# Patient Record
Sex: FEMALE | Race: WHITE | NOT HISPANIC OR LATINO | Employment: FULL TIME | URBAN - METROPOLITAN AREA
[De-identification: names, ages, dates, MRNs, and addresses within clinical notes are randomized per-mention and may not be internally consistent; named-entity substitution may affect disease eponyms.]

---

## 2017-01-01 PROCEDURE — 99283 EMERGENCY DEPT VISIT LOW MDM: CPT

## 2017-01-02 ENCOUNTER — HOSPITAL ENCOUNTER (EMERGENCY)
Facility: HOSPITAL | Age: 57
Discharge: HOME | End: 2017-01-02
Attending: EMERGENCY MEDICINE | Admitting: EMERGENCY MEDICINE

## 2017-01-02 ENCOUNTER — TELEPHONE (OUTPATIENT)
Dept: SOCIAL WORK | Facility: HOSPITAL | Age: 57
End: 2017-01-02

## 2017-01-02 ENCOUNTER — APPOINTMENT (OUTPATIENT)
Dept: GENERAL RADIOLOGY | Facility: HOSPITAL | Age: 57
End: 2017-01-02

## 2017-01-02 VITALS
TEMPERATURE: 97.5 F | HEIGHT: 67 IN | RESPIRATION RATE: 16 BRPM | HEART RATE: 119 BPM | WEIGHT: 230 LBS | BODY MASS INDEX: 36.1 KG/M2 | SYSTOLIC BLOOD PRESSURE: 116 MMHG | OXYGEN SATURATION: 94 % | DIASTOLIC BLOOD PRESSURE: 79 MMHG

## 2017-01-02 DIAGNOSIS — R10.32 LEFT GROIN PAIN: Primary | ICD-10-CM

## 2017-01-02 PROCEDURE — 73502 X-RAY EXAM HIP UNI 2-3 VIEWS: CPT

## 2017-01-02 RX ORDER — HYDROCODONE BITARTRATE AND ACETAMINOPHEN 7.5; 325 MG/1; MG/1
1 TABLET ORAL EVERY 6 HOURS PRN
Qty: 12 TABLET | Refills: 0 | Status: SHIPPED | OUTPATIENT
Start: 2017-01-02 | End: 2017-05-15

## 2017-01-02 RX ORDER — METHOCARBAMOL 500 MG/1
500 TABLET, FILM COATED ORAL 4 TIMES DAILY PRN
Qty: 20 TABLET | Refills: 0 | Status: SHIPPED | OUTPATIENT
Start: 2017-01-02 | End: 2017-05-15

## 2017-01-02 RX ORDER — NAPROXEN 500 MG/1
500 TABLET ORAL 2 TIMES DAILY PRN
Qty: 20 TABLET | Refills: 0 | Status: SHIPPED | OUTPATIENT
Start: 2017-01-02 | End: 2017-05-15

## 2017-01-02 RX ORDER — IBUPROFEN 800 MG/1
800 TABLET ORAL ONCE
Status: COMPLETED | OUTPATIENT
Start: 2017-01-02 | End: 2017-01-02

## 2017-01-02 RX ADMIN — IBUPROFEN 800 MG: 800 TABLET, FILM COATED ORAL at 02:41

## 2017-01-02 NOTE — ED PROVIDER NOTES
EMERGENCY DEPARTMENT ENCOUNTER    CHIEF COMPLAINT  Chief Complaint: Leg Pain  History given by: Patient  History limited by: Nothing  Room Number: 03/03  PMD: Jonnie Crowell MD      HPI:  Pt is a 56 y.o. female who presents complaining of sharp shooting left upper leg pain that began last night with no known trauma. The patient states that the pain radiates through the groin and down her leg. The patient reports that pain is positional and worsened with sitting down or movement of her LLE. She states that she's had plantar fasciitis for the past two months and she's been ambulating with a limp/ has been overcompensating on her left side since onset. She denies abdominal pain and has no other complaints.     Duration:  1 day  Onset: Gradual  Timing: Constant  Location: Left upper leg  Radiation: Left groin  Quality: Sharp  Intensity/Severity: Moderate  Progression: Worsening  Associated Symptoms: Leg pain  Aggravating Factors: Movement or sitting  Alleviating Factors: Standing  Previous Episodes: None  Treatment before arrival: None    PAST MEDICAL HISTORY  Active Ambulatory Problems     Diagnosis Date Noted   • Cervical radiculopathy 02/24/2016   • Edema 02/24/2016   • Ulnar nerve entrapment 02/24/2016   • Fibromyalgia 02/24/2016   • Insomnia 02/24/2016   • Multiple sclerosis 02/24/2016   • Varicose veins of lower extremities 02/24/2016   • Thumb tendonitis 04/13/2016   • Osteoarthritis of right thumb 04/13/2016   • Restless leg syndrome 12/02/2016     Resolved Ambulatory Problems     Diagnosis Date Noted   • No Resolved Ambulatory Problems     No Additional Past Medical History       PAST SURGICAL HISTORY  History reviewed. No pertinent past surgical history.    FAMILY HISTORY  History reviewed. No pertinent family history.    SOCIAL HISTORY  Social History     Social History   • Marital status:      Spouse name: N/A   • Number of children: N/A   • Years of education: N/A     Occupational History    • Not on file.     Social History Main Topics   • Smoking status: Former Smoker   • Smokeless tobacco: Not on file   • Alcohol use Not on file   • Drug use: Not on file   • Sexual activity: Not on file     Other Topics Concern   • Not on file     Social History Narrative       ALLERGIES  Review of patient's allergies indicates no known allergies.    REVIEW OF SYSTEMS  Review of Systems   Constitutional: Negative for chills and fatigue.   HENT: Negative for congestion, rhinorrhea and sore throat.    Eyes: Negative for pain.   Respiratory: Negative for cough, chest tightness, shortness of breath and wheezing.    Cardiovascular: Negative for chest pain, palpitations and leg swelling.   Gastrointestinal: Negative for abdominal pain, diarrhea, nausea and vomiting.   Genitourinary: Negative for difficulty urinating, dysuria, flank pain and frequency.   Musculoskeletal: Positive for myalgias (Left upper leg ). Negative for arthralgias, neck pain and neck stiffness.   Skin: Negative for rash.   Neurological: Negative for dizziness, speech difficulty, weakness, light-headedness, numbness and headaches.   Psychiatric/Behavioral: Negative.    All other systems reviewed and are negative.      PHYSICAL EXAM  ED Triage Vitals   Temp Heart Rate Resp BP SpO2   01/01/17 2101 01/01/17 2101 01/01/17 2101 01/01/17 2108 01/01/17 2101   97.5 °F (36.4 °C) 119 16 151/71 99 %      Temp src Heart Rate Source Patient Position BP Location FiO2 (%)   01/01/17 2101 01/01/17 2101 -- -- --   Tympanic Monitor          Physical Exam   Constitutional: She is oriented to person, place, and time and well-developed, well-nourished, and in no distress. No distress.   HENT:   Head: Normocephalic.   Eyes: EOM are normal. Pupils are equal, round, and reactive to light.   Neck: Normal range of motion.   Cardiovascular: Normal rate and regular rhythm.    No murmur heard.  Pulmonary/Chest: Effort normal and breath sounds normal. No respiratory distress.    Abdominal: Soft. There is no tenderness. There is no rebound and no guarding.   Musculoskeletal: She exhibits no edema.        Left hip: She exhibits decreased range of motion and tenderness.   Neurological: She is alert and oriented to person, place, and time.   Skin: Skin is warm and dry. No rash noted.   Psychiatric: Mood and affect normal.   Nursing note and vitals reviewed.      RADIOLOGY  XR Hip With or Without Pelvis 2 - 3 View Left   Final Result         0325  Reviewed XR L Hip - Negative. Independently viewed by me. Interpreted by radiologist.     I ordered the above noted radiological studies. Interpreted by radiologist.. Reviewed by me in PACS.       PROCEDURES  Procedures      PROGRESS AND CONSULTS  ED Course     0159  Ordered XR L Hip for further evaluation. Also ordered Advil for pain control.     0235  Discussed case with  and he agrees with the treatment plan.     0330  Rechecked patient and they are resting comfortably. Discussed the patient's pertinent imaging results, including negative XR. Discussed plan to discharge the patient home and recommended follow up with  (Ortho). Patient agrees with the plan and all questions were addressed.     Latest vital signs   BP- 119/76 HR- 119 Temp- 97.5 °F (36.4 °C) (Tympanic) O2 sat- 99%      MEDICAL DECISION MAKING  Results were reviewed/discussed with the patient and they were also made aware of online access. Pt also made aware that some labs, such as cultures, will not be resulted during ER visit and follow up with PMD is necessary.     MDM  Number of Diagnoses or Management Options     Amount and/or Complexity of Data Reviewed  Tests in the radiology section of CPT®: ordered and reviewed    Patient Progress  Patient progress: stable         DIAGNOSIS  Final diagnoses:   Left groin pain       DISPOSITION  DISCHARGE    Patient discharged in stable condition.    Reviewed implications of results, diagnosis, meds, responsibility to follow  up, warning signs and symptoms of possible worsening, potential complications and reasons to return to ER, including worsening left leg pain    Patient/Family voiced understanding of above instructions.    Discussed plan for discharge, as there is no emergent indication for admission.  Pt/family is agreeable and understands need for follow up and repeat testing.  Pt is aware that discharge does not mean that nothing is wrong but it indicates no emergency is present that requires admission and they must continue care with follow-up as given below or physician of their choice.     FOLLOW-UP  Riky Rutledge MD  [address] [id number]  [phone]    Schedule an appointment as soon as possible for a visit  For further evaluation and treatment       Medication List      New Prescriptions          HYDROcodone-acetaminophen 7.5-325 MG per tablet   Commonly known as:  NORCO   Take 1 tablet by mouth Every 6 (Six) Hours As Needed for severe pain   (7-10).       methocarbamol 500 MG tablet   Commonly known as:  ROBAXIN   Take 1 tablet by mouth 4 (Four) Times a Day As Needed for muscle spasms.       naproxen 500 MG tablet   Commonly known as:  NAPROSYN   Take 1 tablet by mouth 2 (Two) Times a Day As Needed for mild pain (1-3).         Stop          meloxicam 15 MG tablet   Commonly known as:  MOBIC           Latest Documented Vital Signs:  As of 3:21 AM  BP- 119/76 HR- 119 Temp- 97.5 °F (36.4 °C) (Tympanic) O2 sat- 99%    --  Documentation assistance provided by mark Mccormick for Celestine Barba PA-C.  Information recorded by the mark was done at my direction and has been verified and validated by me.       Babak Mccormick  01/02/17 0325       THAIS Lyle III  01/04/17 1331

## 2017-01-02 NOTE — ED PROVIDER NOTES
I supervised care provided by the midlevel provider.    We have discussed this patient's history, physical exam, and treatment plan.   I have reviewed the note and personally saw and examined the patient and agree with the plan of care.    Pt with L upper leg pain that onset yesterday AM. Pt states the pain is worse with movement. She is currently in tx for plantar fascisitis for the past 2 months.     On eam, there is tenderness of the L inguinal and increased pain with ROM. The thigh is non-tender and there is no swelling or erythema.    She will be discharged with pain meds and follow up for her plantar fascitis.     Documentation assistance provided by mark Abernathy for Dr. Schneider.  Information recorded by the scribe was done at my direction and has been verified and validated by me.       Carlton Abernathy  01/02/17 0203       Chris Schneider MD  01/02/17 0646

## 2017-01-02 NOTE — ED NOTES
Upper leg pains-patient states she has a hard time getting up and down from different positions.      Kristi Garcia RN  01/01/17 1748

## 2017-05-15 ENCOUNTER — OFFICE VISIT (OUTPATIENT)
Dept: FAMILY MEDICINE CLINIC | Facility: CLINIC | Age: 57
End: 2017-05-15

## 2017-05-15 VITALS
HEIGHT: 67 IN | BODY MASS INDEX: 35.47 KG/M2 | WEIGHT: 226 LBS | HEART RATE: 76 BPM | TEMPERATURE: 98.2 F | SYSTOLIC BLOOD PRESSURE: 122 MMHG | DIASTOLIC BLOOD PRESSURE: 80 MMHG

## 2017-05-15 DIAGNOSIS — Z00.00 VISIT FOR PREVENTIVE HEALTH EXAMINATION: Primary | ICD-10-CM

## 2017-05-15 DIAGNOSIS — M15.9 PRIMARY OSTEOARTHRITIS INVOLVING MULTIPLE JOINTS: ICD-10-CM

## 2017-05-15 DIAGNOSIS — G35 MULTIPLE SCLEROSIS (HCC): ICD-10-CM

## 2017-05-15 DIAGNOSIS — F51.01 PRIMARY INSOMNIA: ICD-10-CM

## 2017-05-15 DIAGNOSIS — G25.81 RESTLESS LEG SYNDROME: ICD-10-CM

## 2017-05-15 DIAGNOSIS — D32.9 MENINGIOMA (HCC): ICD-10-CM

## 2017-05-15 DIAGNOSIS — M79.671 BILATERAL FOOT PAIN: ICD-10-CM

## 2017-05-15 DIAGNOSIS — M79.672 BILATERAL FOOT PAIN: ICD-10-CM

## 2017-05-15 DIAGNOSIS — M79.7 FIBROMYALGIA: ICD-10-CM

## 2017-05-15 PROBLEM — M15.0 PRIMARY OSTEOARTHRITIS INVOLVING MULTIPLE JOINTS: Status: ACTIVE | Noted: 2017-05-15

## 2017-05-15 PROCEDURE — 99396 PREV VISIT EST AGE 40-64: CPT | Performed by: INTERNAL MEDICINE

## 2017-05-15 RX ORDER — PRAMIPEXOLE DIHYDROCHLORIDE 1.5 MG/1
TABLET ORAL
Qty: 30 TABLET | Refills: 5 | Status: SHIPPED | OUTPATIENT
Start: 2017-05-15 | End: 2017-11-13 | Stop reason: SDUPTHER

## 2017-05-15 RX ORDER — ZOLPIDEM TARTRATE 10 MG/1
10 TABLET ORAL NIGHTLY PRN
Qty: 30 TABLET | Refills: 5 | Status: SHIPPED | OUTPATIENT
Start: 2017-05-15 | End: 2018-01-10

## 2017-05-15 RX ORDER — PREGABALIN 75 MG/1
75 CAPSULE ORAL 2 TIMES DAILY
Qty: 28 CAPSULE | Refills: 0 | Status: SHIPPED | OUTPATIENT
Start: 2017-05-15 | End: 2018-01-10

## 2017-05-16 LAB
25(OH)D3+25(OH)D2 SERPL-MCNC: 27.3 NG/ML (ref 30–100)
ALBUMIN SERPL-MCNC: 4.1 G/DL (ref 3.5–5.2)
ALBUMIN/GLOB SERPL: 1.7 G/DL
ALP SERPL-CCNC: 88 U/L (ref 39–117)
ALT SERPL-CCNC: 12 U/L (ref 1–33)
AST SERPL-CCNC: 17 U/L (ref 1–32)
BASOPHILS # BLD AUTO: 0.04 10*3/MM3 (ref 0–0.2)
BASOPHILS NFR BLD AUTO: 0.6 % (ref 0–1.5)
BILIRUB SERPL-MCNC: 1.1 MG/DL (ref 0.1–1.2)
BUN SERPL-MCNC: 16 MG/DL (ref 6–20)
BUN/CREAT SERPL: 19.8 (ref 7–25)
CALCIUM SERPL-MCNC: 9.2 MG/DL (ref 8.6–10.5)
CHLORIDE SERPL-SCNC: 104 MMOL/L (ref 98–107)
CHOLEST SERPL-MCNC: 190 MG/DL (ref 0–200)
CO2 SERPL-SCNC: 26.9 MMOL/L (ref 22–29)
CREAT SERPL-MCNC: 0.81 MG/DL (ref 0.57–1)
EOSINOPHIL # BLD AUTO: 0.1 10*3/MM3 (ref 0–0.7)
EOSINOPHIL NFR BLD AUTO: 1.6 % (ref 0.3–6.2)
ERYTHROCYTE [DISTWIDTH] IN BLOOD BY AUTOMATED COUNT: 14.3 % (ref 11.7–13)
GLOBULIN SER CALC-MCNC: 2.4 GM/DL
GLUCOSE SERPL-MCNC: 96 MG/DL (ref 65–99)
HCT VFR BLD AUTO: 40.4 % (ref 35.6–45.5)
HCV AB S/CO SERPL IA: 0.1 S/CO RATIO (ref 0–0.9)
HDLC SERPL-MCNC: 66 MG/DL (ref 40–60)
HGB BLD-MCNC: 13 G/DL (ref 11.9–15.5)
IMM GRANULOCYTES # BLD: 0 10*3/MM3 (ref 0–0.03)
IMM GRANULOCYTES NFR BLD: 0 % (ref 0–0.5)
INTERPRETATION: NORMAL
LDLC SERPL CALC-MCNC: 107 MG/DL (ref 0–100)
LYMPHOCYTES # BLD AUTO: 2.17 10*3/MM3 (ref 0.9–4.8)
LYMPHOCYTES NFR BLD AUTO: 34.9 % (ref 19.6–45.3)
MCH RBC QN AUTO: 30.1 PG (ref 26.9–32)
MCHC RBC AUTO-ENTMCNC: 32.2 G/DL (ref 32.4–36.3)
MCV RBC AUTO: 93.5 FL (ref 80.5–98.2)
MONOCYTES # BLD AUTO: 0.44 10*3/MM3 (ref 0.2–1.2)
MONOCYTES NFR BLD AUTO: 7.1 % (ref 5–12)
NEUTROPHILS # BLD AUTO: 3.47 10*3/MM3 (ref 1.9–8.1)
NEUTROPHILS NFR BLD AUTO: 55.8 % (ref 42.7–76)
PLATELET # BLD AUTO: 235 10*3/MM3 (ref 140–500)
POTASSIUM SERPL-SCNC: 4.2 MMOL/L (ref 3.5–5.2)
PROT SERPL-MCNC: 6.5 G/DL (ref 6–8.5)
RBC # BLD AUTO: 4.32 10*6/MM3 (ref 3.9–5.2)
SODIUM SERPL-SCNC: 143 MMOL/L (ref 136–145)
T4 FREE SERPL-MCNC: 1.15 NG/DL (ref 0.93–1.7)
TRIGL SERPL-MCNC: 86 MG/DL (ref 0–150)
TSH SERPL DL<=0.005 MIU/L-ACNC: 2.44 MIU/ML (ref 0.27–4.2)
VLDLC SERPL CALC-MCNC: 17.2 MG/DL (ref 5–40)
WBC # BLD AUTO: 6.22 10*3/MM3 (ref 4.5–10.7)

## 2017-11-13 RX ORDER — PRAMIPEXOLE DIHYDROCHLORIDE 1.5 MG/1
TABLET ORAL
Qty: 30 TABLET | Refills: 5 | Status: SHIPPED | OUTPATIENT
Start: 2017-11-13 | End: 2018-01-10

## 2017-12-30 ENCOUNTER — HOSPITAL ENCOUNTER (EMERGENCY)
Facility: HOSPITAL | Age: 57
Discharge: HOME | End: 2017-12-30
Attending: EMERGENCY MEDICINE | Admitting: EMERGENCY MEDICINE

## 2017-12-30 ENCOUNTER — APPOINTMENT (OUTPATIENT)
Dept: CARDIOLOGY | Facility: HOSPITAL | Age: 57
End: 2017-12-30

## 2017-12-30 ENCOUNTER — APPOINTMENT (OUTPATIENT)
Dept: CT IMAGING | Facility: HOSPITAL | Age: 57
End: 2017-12-30

## 2017-12-30 VITALS
RESPIRATION RATE: 17 BRPM | DIASTOLIC BLOOD PRESSURE: 72 MMHG | BODY MASS INDEX: 36.1 KG/M2 | HEIGHT: 67 IN | TEMPERATURE: 98.1 F | HEART RATE: 71 BPM | SYSTOLIC BLOOD PRESSURE: 137 MMHG | OXYGEN SATURATION: 97 % | WEIGHT: 230 LBS

## 2017-12-30 DIAGNOSIS — R55 SYNCOPE, UNSPECIFIED SYNCOPE TYPE: Primary | ICD-10-CM

## 2017-12-30 LAB
ALBUMIN SERPL-MCNC: 3.8 G/DL (ref 3.5–5.2)
ALBUMIN/GLOB SERPL: 1.1 G/DL
ALP SERPL-CCNC: 98 U/L (ref 39–117)
ALT SERPL W P-5'-P-CCNC: 19 U/L (ref 1–33)
ANION GAP SERPL CALCULATED.3IONS-SCNC: 11.3 MMOL/L
AST SERPL-CCNC: 23 U/L (ref 1–32)
BASOPHILS # BLD AUTO: 0.04 10*3/MM3 (ref 0–0.2)
BASOPHILS NFR BLD AUTO: 0.5 % (ref 0–1.5)
BILIRUB SERPL-MCNC: 1 MG/DL (ref 0.1–1.2)
BUN BLD-MCNC: 20 MG/DL (ref 6–20)
BUN/CREAT SERPL: 19.6 (ref 7–25)
CALCIUM SPEC-SCNC: 8.8 MG/DL (ref 8.6–10.5)
CHLORIDE SERPL-SCNC: 104 MMOL/L (ref 98–107)
CO2 SERPL-SCNC: 25.7 MMOL/L (ref 22–29)
CREAT BLD-MCNC: 1.02 MG/DL (ref 0.57–1)
D DIMER PPP FEU-MCNC: 0.34 MCGFEU/ML (ref 0–0.49)
DEPRECATED RDW RBC AUTO: 46.8 FL (ref 37–54)
EOSINOPHIL # BLD AUTO: 0.1 10*3/MM3 (ref 0–0.7)
EOSINOPHIL NFR BLD AUTO: 1.1 % (ref 0.3–6.2)
ERYTHROCYTE [DISTWIDTH] IN BLOOD BY AUTOMATED COUNT: 13.9 % (ref 11.7–13)
GFR SERPL CREATININE-BSD FRML MDRD: 56 ML/MIN/1.73
GLOBULIN UR ELPH-MCNC: 3.5 GM/DL
GLUCOSE BLD-MCNC: 107 MG/DL (ref 65–99)
HCT VFR BLD AUTO: 40.8 % (ref 35.6–45.5)
HGB BLD-MCNC: 13.3 G/DL (ref 11.9–15.5)
HOLD SPECIMEN: NORMAL
HOLD SPECIMEN: NORMAL
IMM GRANULOCYTES # BLD: 0.02 10*3/MM3 (ref 0–0.03)
IMM GRANULOCYTES NFR BLD: 0.2 % (ref 0–0.5)
LYMPHOCYTES # BLD AUTO: 2.76 10*3/MM3 (ref 0.9–4.8)
LYMPHOCYTES NFR BLD AUTO: 31.3 % (ref 19.6–45.3)
MAGNESIUM SERPL-MCNC: 2 MG/DL (ref 1.6–2.6)
MCH RBC QN AUTO: 29.8 PG (ref 26.9–32)
MCHC RBC AUTO-ENTMCNC: 32.6 G/DL (ref 32.4–36.3)
MCV RBC AUTO: 91.3 FL (ref 80.5–98.2)
MONOCYTES # BLD AUTO: 0.44 10*3/MM3 (ref 0.2–1.2)
MONOCYTES NFR BLD AUTO: 5 % (ref 5–12)
NEUTROPHILS # BLD AUTO: 5.45 10*3/MM3 (ref 1.9–8.1)
NEUTROPHILS NFR BLD AUTO: 61.9 % (ref 42.7–76)
PLATELET # BLD AUTO: 243 10*3/MM3 (ref 140–500)
PMV BLD AUTO: 10.9 FL (ref 6–12)
POTASSIUM BLD-SCNC: 3.9 MMOL/L (ref 3.5–5.2)
PROT SERPL-MCNC: 7.3 G/DL (ref 6–8.5)
RBC # BLD AUTO: 4.47 10*6/MM3 (ref 3.9–5.2)
SODIUM BLD-SCNC: 141 MMOL/L (ref 136–145)
TROPONIN T SERPL-MCNC: <0.01 NG/ML (ref 0–0.03)
WBC NRBC COR # BLD: 8.81 10*3/MM3 (ref 4.5–10.7)
WHOLE BLOOD HOLD SPECIMEN: NORMAL
WHOLE BLOOD HOLD SPECIMEN: NORMAL

## 2017-12-30 PROCEDURE — 93005 ELECTROCARDIOGRAM TRACING: CPT

## 2017-12-30 PROCEDURE — 83735 ASSAY OF MAGNESIUM: CPT | Performed by: EMERGENCY MEDICINE

## 2017-12-30 PROCEDURE — 0296T HC EXT ECG > 48HR TO 21 DAY RCRD W/CONECT INTL RCRD: CPT

## 2017-12-30 PROCEDURE — 85025 COMPLETE CBC W/AUTO DIFF WBC: CPT | Performed by: EMERGENCY MEDICINE

## 2017-12-30 PROCEDURE — 84484 ASSAY OF TROPONIN QUANT: CPT | Performed by: EMERGENCY MEDICINE

## 2017-12-30 PROCEDURE — 70450 CT HEAD/BRAIN W/O DYE: CPT

## 2017-12-30 PROCEDURE — 99284 EMERGENCY DEPT VISIT MOD MDM: CPT

## 2017-12-30 PROCEDURE — 36415 COLL VENOUS BLD VENIPUNCTURE: CPT | Performed by: EMERGENCY MEDICINE

## 2017-12-30 PROCEDURE — 85379 FIBRIN DEGRADATION QUANT: CPT | Performed by: NURSE PRACTITIONER

## 2017-12-30 PROCEDURE — 80053 COMPREHEN METABOLIC PANEL: CPT | Performed by: EMERGENCY MEDICINE

## 2017-12-30 PROCEDURE — 93010 ELECTROCARDIOGRAM REPORT: CPT | Performed by: INTERNAL MEDICINE

## 2017-12-30 RX ORDER — SODIUM CHLORIDE 0.9 % (FLUSH) 0.9 %
10 SYRINGE (ML) INJECTION AS NEEDED
Status: DISCONTINUED | OUTPATIENT
Start: 2017-12-30 | End: 2017-12-30 | Stop reason: HOSPADM

## 2018-01-02 ENCOUNTER — TELEPHONE (OUTPATIENT)
Dept: SOCIAL WORK | Facility: HOSPITAL | Age: 58
End: 2018-01-02

## 2018-01-10 ENCOUNTER — OFFICE VISIT (OUTPATIENT)
Dept: FAMILY MEDICINE CLINIC | Facility: CLINIC | Age: 58
End: 2018-01-10

## 2018-01-10 VITALS
DIASTOLIC BLOOD PRESSURE: 78 MMHG | OXYGEN SATURATION: 99 % | HEIGHT: 67 IN | BODY MASS INDEX: 36.26 KG/M2 | WEIGHT: 231 LBS | SYSTOLIC BLOOD PRESSURE: 122 MMHG | TEMPERATURE: 97.9 F | HEART RATE: 75 BPM

## 2018-01-10 DIAGNOSIS — R55 SYNCOPE, UNSPECIFIED SYNCOPE TYPE: Primary | ICD-10-CM

## 2018-01-10 DIAGNOSIS — G47.9 SLEEP DISORDER: ICD-10-CM

## 2018-01-10 DIAGNOSIS — G47.419 PRIMARY NARCOLEPSY WITHOUT CATAPLEXY: ICD-10-CM

## 2018-01-10 PROCEDURE — 99214 OFFICE O/P EST MOD 30 MIN: CPT | Performed by: INTERNAL MEDICINE

## 2018-01-10 RX ORDER — GABAPENTIN 300 MG/1
300 CAPSULE ORAL NIGHTLY
Qty: 30 CAPSULE | Refills: 5 | Status: SHIPPED | OUTPATIENT
Start: 2018-01-10 | End: 2018-02-07

## 2018-01-10 NOTE — PROGRESS NOTES
Subjective chief complaint is follow-up from emergency room visit  Cayla Lopez is a 57 y.o. female.     History of Present Illness   Cayla is here today for follow-up.  She was in the emergency room around [place] time.  She had a sudden syncopal episode without warning.  She was putting ornaments and a box.  She was sitting while doing this.  She partly fell forward.  She did hit her head on the corner of a box.  She did not necessarily lose consciousness from the blow.  In the emergency room her workup was unremarkable.  Her CAT scan of the head showed no significant problems and no subdural hematoma.  Her lab work looked okay.  She has since returned and a Zio patch.  The results of this are not known.  She has been on a fairly high dose of Mirapex for restless legs.  She has not been taking the Lyrica.  She does have chronic insomnia.  Even with the Ambien she is only sleeping 3 hours per night.  She is also reporting that she suddenly drops asleep during the day.  She reports a prior to coming over her she was sitting at the table and suddenly was asleep.  Past medical history is remarkable for MS.  The following portions of the patient's history were reviewed and updated as appropriate: allergies, current medications, past medical history and problem list.    Review of Systems   Constitutional: Negative for chills and fever.   Respiratory: Negative for chest tightness and shortness of breath.    Cardiovascular: Negative for chest pain and palpitations.   Gastrointestinal: Negative for nausea.   Neurological: Negative for dizziness, weakness, light-headedness and headaches.       Objective   Physical Exam   Constitutional: She is oriented to person, place, and time. She appears well-developed and well-nourished.   HENT:   Head: Normocephalic.   Eyes: EOM are normal. Pupils are equal, round, and reactive to light.   Cardiovascular: Normal rate, regular rhythm and normal heart sounds.    Pulmonary/Chest: Effort  normal and breath sounds normal. No respiratory distress. She has no wheezes. She has no rales.   Neurological: She is alert and oriented to person, place, and time. No cranial nerve deficit. Coordination normal.   Psychiatric: She has a normal mood and affect.   Nursing note and vitals reviewed.      Assessment/Plan   Cayla was seen today for follow-up.    Diagnoses and all orders for this visit:    Syncope, unspecified syncope type  -     Ambulatory Referral to Neurology    Sleep disorder    Primary narcolepsy without cataplexy  -     Ambulatory Referral to Neurology    Other orders  -     gabapentin (NEURONTIN) 300 MG capsule; Take 1 capsule by mouth Every Night.      Cayla is here today for follow-up.  She did have what sounds like a syncopal episode.  I would wonder whether this may have been some sort of drop attack from the Mirapex that she takes.  Would also be concerned with her sudden falling asleep episodes whether this could be a narcolepsy type of phenomenon.  I am going to get her an appointment with a neurologist.  I have stopped the Mirapex and Ambien.  I am going to have her use gabapentin at night for restless legs.  She is going to see me back in one month.

## 2018-01-12 PROCEDURE — 0298T HOLTER MONITOR - 72 HOUR UP TO 21 DAY: CPT | Performed by: INTERNAL MEDICINE

## 2018-01-15 ENCOUNTER — TELEPHONE (OUTPATIENT)
Dept: FAMILY MEDICINE CLINIC | Facility: CLINIC | Age: 58
End: 2018-01-15

## 2018-01-15 RX ORDER — PRAMIPEXOLE DIHYDROCHLORIDE 1.5 MG/1
0.75 TABLET ORAL NIGHTLY
Qty: 30 TABLET | Refills: 3 | Status: SHIPPED | OUTPATIENT
Start: 2018-01-15 | End: 2018-03-08 | Stop reason: SDUPTHER

## 2018-01-15 NOTE — TELEPHONE ENCOUNTER
----- Message from Cayla Lopez sent at 1/15/2018  4:57 AM EST -----  Regarding: Visit Follow-Up Question  Contact: [phone]  GOOD morning Dr. Crowell,    I have stopped taking the Mirapex (PRAMEPEXOLE), and have started the Gabapenton (Neurotin) .  I have now gone 2 complete nights with very little  to no sleep.  The jerking in my legs from the RLS is non-stop and very painful.   I may have had a total of 1 hour sleep at a time due to exhaustion.   I cannot lose any more sleep!!!  I am so miserable.  What can I do to get my legs to stop jerking, hurting.  Please advise asap.  Thank you!     Cayla  I advised the patient that I would have her use one half of her previous dose on the Mirapex along with the gabapentin.

## 2018-02-07 ENCOUNTER — TELEPHONE (OUTPATIENT)
Dept: FAMILY MEDICINE CLINIC | Facility: CLINIC | Age: 58
End: 2018-02-07

## 2018-02-07 ENCOUNTER — OFFICE VISIT (OUTPATIENT)
Dept: FAMILY MEDICINE CLINIC | Facility: CLINIC | Age: 58
End: 2018-02-07

## 2018-02-07 VITALS
OXYGEN SATURATION: 99 % | WEIGHT: 236 LBS | SYSTOLIC BLOOD PRESSURE: 120 MMHG | HEIGHT: 67 IN | HEART RATE: 69 BPM | TEMPERATURE: 98.5 F | BODY MASS INDEX: 37.04 KG/M2 | DIASTOLIC BLOOD PRESSURE: 82 MMHG

## 2018-02-07 DIAGNOSIS — M62.838 CERVICAL PARASPINAL MUSCLE SPASM: ICD-10-CM

## 2018-02-07 DIAGNOSIS — G25.81 RESTLESS LEGS SYNDROME (RLS): ICD-10-CM

## 2018-02-07 DIAGNOSIS — R55 SYNCOPE, UNSPECIFIED SYNCOPE TYPE: Primary | ICD-10-CM

## 2018-02-07 PROCEDURE — 99213 OFFICE O/P EST LOW 20 MIN: CPT | Performed by: INTERNAL MEDICINE

## 2018-02-07 RX ORDER — NAPROXEN 375 MG/1
375 TABLET ORAL 2 TIMES DAILY PRN
Qty: 60 TABLET | Refills: 1 | Status: SHIPPED | OUTPATIENT
Start: 2018-02-07 | End: 2018-02-07 | Stop reason: SDUPTHER

## 2018-02-07 RX ORDER — GABAPENTIN 100 MG/1
100 CAPSULE ORAL NIGHTLY
Qty: 30 CAPSULE | Refills: 5 | Status: SHIPPED | OUTPATIENT
Start: 2018-02-07 | End: 2018-06-06 | Stop reason: SDUPTHER

## 2018-02-07 RX ORDER — NAPROXEN 375 MG/1
375 TABLET ORAL 2 TIMES DAILY PRN
Qty: 180 TABLET | Refills: 0 | Status: SHIPPED | OUTPATIENT
Start: 2018-02-07 | End: 2018-05-07 | Stop reason: SDUPTHER

## 2018-02-07 NOTE — PROGRESS NOTES
Subjective chief complaint is follow-up for syncope  Cayla Lopez is a 57 y.o. female.     History of Present Illness   Cayla is here today for follow-up.  At her last visit we did discuss that she could be having some syncopal type episodes from being on a high dose of Mirapex.  We did try to stop her Mirapex for restless leg syndrome seem to get worse.  We then put her on half of her usual dose.  This is only been partially effective and sometimes she has used the full dose.  She has not had any additional syncopal spells.  She is complaining of a little bit of neck discomfort since her last fall.  We were also concerned about the possibility of narcolepsy.  She reports falling asleep very easily during the day.  She reports that the 300 mg gabapentin dose seem to make her sleepy during the day even though she took at 9:00 the night before.  I could not find the order that was entered into the computer.  I'm going to reorder a neurology consult.  Her Holter monitor was largely unremarkable.  The following portions of the patient's history were reviewed and updated as appropriate: allergies, current medications, past medical history and problem list.    Review of Systems   Musculoskeletal: Positive for neck pain and neck stiffness.   Neurological: Positive for syncope.       Objective   Physical Exam   Constitutional: She is oriented to person, place, and time.   Neck:   There is some cervical paraspinous muscle tenderness and spasm   Neurological: She is alert and oriented to person, place, and time. A cranial nerve deficit is present.   Nursing note and vitals reviewed.      Assessment/Plan   Cayla was seen today for follow-up.    Diagnoses and all orders for this visit:    Syncope, unspecified syncope type  -     Ambulatory Referral to Neurology    Restless legs syndrome (RLS)  -     Ambulatory Referral to Neurology    Cervical paraspinal muscle spasm    Other orders  -     gabapentin (NEURONTIN) 100 MG capsule;  Take 1 capsule by mouth Every Night.  -     naproxen (NAPROSYN) 375 MG tablet; Take 1 tablet by mouth 2 (Two) Times a Day As Needed for Moderate Pain .     Cayla is here today for follow-up.  I am going to reorder the referral to the neurologist.  I did advise her try not to use a whole Mirapex if at all possible.  We are going to cut her gabapentin back to 100 mg.

## 2018-02-07 NOTE — TELEPHONE ENCOUNTER
----- Message from Cayla Lopez sent at 2/7/2018 12:16 PM EST -----  Regarding: Visit Follow-Up Question  Contact: [phone]  Hi,   I was in to see Dr. Crowell today (Feb 7).  He mentioned something about referring me to a neurologist.  I did not see anything on my summary.  Can you check to see if this is something I need to set up myself or was this going to be set up from Dr. Crowell.  Thank you!   Cayla Lopez

## 2018-03-08 RX ORDER — PRAMIPEXOLE DIHYDROCHLORIDE 1.5 MG/1
1.5 TABLET ORAL NIGHTLY
Qty: 30 TABLET | Refills: 3 | Status: SHIPPED | OUTPATIENT
Start: 2018-03-08 | End: 2018-07-02 | Stop reason: SDUPTHER

## 2018-03-21 ENCOUNTER — HOSPITAL ENCOUNTER (OUTPATIENT)
Dept: GENERAL RADIOLOGY | Facility: HOSPITAL | Age: 58
Discharge: HOME | End: 2018-03-21
Attending: INTERNAL MEDICINE | Admitting: INTERNAL MEDICINE

## 2018-03-21 ENCOUNTER — OFFICE VISIT (OUTPATIENT)
Dept: FAMILY MEDICINE CLINIC | Facility: CLINIC | Age: 58
End: 2018-03-21

## 2018-03-21 VITALS
BODY MASS INDEX: 37.04 KG/M2 | HEART RATE: 78 BPM | DIASTOLIC BLOOD PRESSURE: 76 MMHG | SYSTOLIC BLOOD PRESSURE: 120 MMHG | WEIGHT: 236 LBS | HEIGHT: 67 IN | OXYGEN SATURATION: 98 % | TEMPERATURE: 98 F

## 2018-03-21 DIAGNOSIS — S80.11XA TRAUMATIC ECCHYMOSIS OF RIGHT LOWER LEG, INITIAL ENCOUNTER: ICD-10-CM

## 2018-03-21 DIAGNOSIS — S80.01XA CONTUSION OF RIGHT KNEE, INITIAL ENCOUNTER: Primary | ICD-10-CM

## 2018-03-21 DIAGNOSIS — S80.01XA CONTUSION OF RIGHT KNEE, INITIAL ENCOUNTER: ICD-10-CM

## 2018-03-21 PROCEDURE — 73562 X-RAY EXAM OF KNEE 3: CPT

## 2018-03-21 PROCEDURE — 99214 OFFICE O/P EST MOD 30 MIN: CPT | Performed by: INTERNAL MEDICINE

## 2018-03-21 NOTE — PROGRESS NOTES
Subjective chief complaint is right knee injury  Cayla Lopez is a 57 y.o. female.     History of Present Illness   Cayla is here today for complaints of moderate to severe pain in her right knee after fall.  This occurred 2 days ago.  She was stepping off of a curb and lost her balance.  She landed on her right knee.  Since that time it is had considerable swelling and bruising.  She is also getting some swelling down around her ankle.  She is not having problems with flexion or extension.  She is not having trouble bearing weight.  She has been using some ice and elevation.  Past medical history is remarkable for multiple sclerosis.  She also has some prior Achilles tendon problems in her right leg.  The following portions of the patient's history were reviewed and updated as appropriate: allergies, current medications, past medical history and problem list.    Review of Systems   Musculoskeletal: Positive for gait problem and joint swelling.   Skin: Positive for color change and bruise.   Neurological: Negative for weakness and numbness.   Hematological: Does not bruise/bleed easily.       Objective   Physical Exam   Constitutional: She appears well-developed and well-nourished.   HENT:   Head: Normocephalic and atraumatic.   Cardiovascular: Intact distal pulses.    Musculoskeletal:   She does have considerable ecchymoses and soft tissue swelling about the right knee.  There is some pain on palpation of the lateral aspect of the patella.  I do not palpate any fractures.  There is a posterior swelling likely due to a popliteal cyst.  Actually her ankles are about the same size in terms of circumference.  She has a 13-1/4 inch diameter 4 inches above the ankle.   Nursing note and vitals reviewed.        Assessment/Plan   Cayla was seen today for leg injury.    Diagnoses and all orders for this visit:    Contusion of right knee, initial encounter  -     XR Knee 3 View Right; Future    Traumatic ecchymosis of right lower  leg, initial encounter  -     XR Knee 3 View Right; Future     Cayla is here today for pain in her right knee after fall.  I do believe this is just going to be a contusion with ecchymoses.  However because of the degree of swelling and pain I am going to rule out a patellar fracture.  She is going to go for some knee x-rays and I will call her with the report.  In the interim she is going to use elevation and ice as well as analgesics over-the-counter.  In the interim she is going to do some intermittent gentle range of motion flexion and extension exercises with the knee.

## 2018-03-27 ENCOUNTER — TELEPHONE (OUTPATIENT)
Dept: FAMILY MEDICINE CLINIC | Facility: CLINIC | Age: 58
End: 2018-03-27

## 2018-03-27 NOTE — TELEPHONE ENCOUNTER
----- Message from Cayla John sent at 3/27/2018 10:59 AM EDT -----  Regarding: RE: Non-Urgent Medical Question  Contact: [phone]  Thank You!  Just needed reassuring that all is normal.  Happy Easter!  ----- Message -----  From: Jonnie Crowell MD  Sent: 3/27/2018 10:40 AM EDT  To: Caylacarolee Lopez  Subject: RE: Non-Urgent Medical Question  From the pictures this appears tob normal tracking down the leg blood/bruise. Continue to elevate leg at or above level of heart several times daily for 30 to 60 minutes    ----- Message -----     From: Cayla Lopez     Sent: 3/26/2018  8:18 PM EDT       To: Jonnie Crowell MD  Subject: Non-Urgent Medical Question    Hi Dr. Crowell,   I am attaching picture of leg I fell on.  Leg and foot continue to swell  and bruising is worse. Is this anything I need to be concerned about?  I am still icing and elevating.    Knee is sore and only hurts minimally, but foot hurts when walking due to swelling.  Is there anything I can do to help reduce swelling?       Also, now lower back is hurting badly, and neck still hurts constantly from when I fell 1/30/17, which has been giving me headaches.    I have been taking the prescription strength naproxon at bed time and only if needed during the day.    any encouragement or help is appreciated.  Thank You!    Cayla Lopez  [phone] (can leave message, or [phone])

## 2018-04-20 ENCOUNTER — OFFICE VISIT (OUTPATIENT)
Dept: FAMILY MEDICINE CLINIC | Facility: CLINIC | Age: 58
End: 2018-04-20

## 2018-04-20 VITALS
SYSTOLIC BLOOD PRESSURE: 112 MMHG | DIASTOLIC BLOOD PRESSURE: 60 MMHG | BODY MASS INDEX: 36.7 KG/M2 | OXYGEN SATURATION: 98 % | WEIGHT: 233.8 LBS | RESPIRATION RATE: 16 BRPM | TEMPERATURE: 98 F | HEIGHT: 67 IN | HEART RATE: 67 BPM

## 2018-04-20 DIAGNOSIS — J10.1 INFLUENZA B: Primary | ICD-10-CM

## 2018-04-20 LAB
EXPIRATION DATE: NORMAL
FLUAV AG NPH QL: NEGATIVE
FLUBV AG NPH QL: POSITIVE
INTERNAL CONTROL: NORMAL
Lab: NORMAL

## 2018-04-20 PROCEDURE — 99213 OFFICE O/P EST LOW 20 MIN: CPT | Performed by: FAMILY MEDICINE

## 2018-04-20 PROCEDURE — 87804 INFLUENZA ASSAY W/OPTIC: CPT | Performed by: FAMILY MEDICINE

## 2018-04-20 RX ORDER — OSELTAMIVIR PHOSPHATE 75 MG/1
75 CAPSULE ORAL 2 TIMES DAILY
Qty: 10 CAPSULE | Refills: 0 | Status: SHIPPED | OUTPATIENT
Start: 2018-04-20 | End: 2018-05-02

## 2018-04-20 NOTE — PROGRESS NOTES
HPI  Cayla Lopez is a 57 y.o. female who is here for 2 day history of congestion fever chills and severe body aches.       Review of Systems   Constitutional: Positive for chills, fatigue and fever. Negative for diaphoresis.   HENT: Positive for congestion and rhinorrhea.    Respiratory: Positive for cough.    Cardiovascular: Positive for leg swelling.   Musculoskeletal: Positive for back pain, myalgias, neck pain and neck stiffness.   Neurological: Positive for headaches.         Past Medical History:   Diagnosis Date   • MS (multiple sclerosis)        No past surgical history on file.    No family history on file.    Social History     Social History   • Marital status:      Spouse name: N/A   • Number of children: N/A   • Years of education: N/A     Occupational History   • Not on file.     Social History Main Topics   • Smoking status: Former Smoker   • Smokeless tobacco: Not on file   • Alcohol use Yes      Comment: rarely   • Drug use: Unknown   • Sexual activity: Not on file     Other Topics Concern   • Not on file     Social History Narrative   • No narrative on file         Physical Exam   Constitutional: She is oriented to person, place, and time. She appears well-developed and well-nourished. No distress.   HENT:   Head: Normocephalic.   Nose: Nose normal.   Mouth/Throat: Oropharynx is clear and moist. No oropharyngeal exudate.   Eyes: Conjunctivae and EOM are normal. Pupils are equal, round, and reactive to light.   Neck: Normal range of motion.   Pulmonary/Chest: Effort normal and breath sounds normal.   Musculoskeletal: She exhibits edema.   Lymphadenopathy:     She has no cervical adenopathy.   Neurological: She is alert and oriented to person, place, and time.   Skin: Skin is warm and dry. No rash noted.   Psychiatric: She has a normal mood and affect. Her behavior is normal. Judgment and thought content normal.   Nursing note and vitals reviewed.        Assessment/Plan    Cayla was seen today  for chills, generalized body aches, nasal congestion, cough and headache.    Diagnoses and all orders for this visit:    Influenza B  -     oseltamivir (TAMIFLU) 75 MG capsule; Take 1 capsule by mouth 2 (Two) Times a Day.      Patient presents with 2 day history of nonspecific respiratory symptoms as well as severe body aches and headaches.  Examination basically unremarkable however test for influenza B is positive.  Especially since symptoms started less than 48 hours ago will treat with above-noted medication.  Otherwise fluids rest and symptomatic therapy.    This note includes information entered using a voice recognition dictation system.  Though reviewed, some nonsensible errors may remain.

## 2018-05-02 ENCOUNTER — OFFICE VISIT (OUTPATIENT)
Dept: FAMILY MEDICINE CLINIC | Facility: CLINIC | Age: 58
End: 2018-05-02

## 2018-05-02 VITALS
BODY MASS INDEX: 36.41 KG/M2 | DIASTOLIC BLOOD PRESSURE: 78 MMHG | TEMPERATURE: 98 F | HEART RATE: 84 BPM | SYSTOLIC BLOOD PRESSURE: 114 MMHG | OXYGEN SATURATION: 98 % | HEIGHT: 67 IN | WEIGHT: 232 LBS

## 2018-05-02 DIAGNOSIS — G89.29 CHRONIC PAIN OF RIGHT KNEE: Primary | ICD-10-CM

## 2018-05-02 DIAGNOSIS — M25.561 CHRONIC PAIN OF RIGHT KNEE: Primary | ICD-10-CM

## 2018-05-02 DIAGNOSIS — M79.89 RIGHT LEG SWELLING: ICD-10-CM

## 2018-05-02 PROCEDURE — 99213 OFFICE O/P EST LOW 20 MIN: CPT | Performed by: INTERNAL MEDICINE

## 2018-05-02 NOTE — PROGRESS NOTES
Subjective chief complaint is right knee pain  Cayla Lopez is a 57 y.o. female.     History of Present Illness   Cayla is here today for follow-up on her right knee.  She fell at the end of March.  She had significant bruising and swelling about the right knee.  This has largely resolved.  I did review the actual x-ray images from that visit.  There was no patellar fracture.  Joint space was fairly well-preserved.  She also is having some swelling in her right ankle by the end of the day.  By the following morning it has resolved.  Past medical history is remarkable for multiple sclerosis.  She seems reluctant to go back and see her neurologist because she does not want another MRI scan.  At her last visit we did discuss the possibility of narcolepsy and she has not seen her neurologist about that as well.  She continues to take Mirapex and a full 1.5 mg nightly dose for restless legs.  The following portions of the patient's history were reviewed and updated as appropriate: allergies, current medications, past medical history and problem list.    Review of Systems   Respiratory: Negative for shortness of breath.    Cardiovascular: Positive for leg swelling. Negative for chest pain.   Musculoskeletal: Positive for joint swelling.       Objective   Physical Exam   Constitutional: She appears well-developed and well-nourished.   Cardiovascular: Normal rate, regular rhythm and normal heart sounds.    Pulmonary/Chest: Effort normal and breath sounds normal.   Musculoskeletal:   Currently there is very little swelling at the right ankle.  There is some crepitation of the right knee.   Nursing note and vitals reviewed.        Assessment/Plan   Cayla was seen today for follow-up.    Diagnoses and all orders for this visit:    Chronic pain of right knee  -     Ambulatory Referral to Orthopedic Surgery    Right leg swelling      Cayla is here today for follow-up.  Her right knee is still giving her problems.  I am going to refer  her to a orthopedist for possible injection.  Right leg swelling is likely due to either a sympathetic edema or possibly some lymphedema due to the injury to her knee.  I did advise that the treatment options would be a low-dose diuretic or compression stockings.  She is going to try the compression stockings.

## 2018-05-07 RX ORDER — NAPROXEN 375 MG/1
375 TABLET ORAL 2 TIMES DAILY PRN
Qty: 180 TABLET | Refills: 0 | Status: SHIPPED | OUTPATIENT
Start: 2018-05-07 | End: 2018-08-10 | Stop reason: SDUPTHER

## 2018-05-17 ENCOUNTER — OFFICE VISIT (OUTPATIENT)
Dept: ORTHOPEDIC SURGERY | Facility: CLINIC | Age: 58
End: 2018-05-17

## 2018-05-17 VITALS — WEIGHT: 232.6 LBS | TEMPERATURE: 97.7 F | HEIGHT: 67 IN | BODY MASS INDEX: 36.51 KG/M2

## 2018-05-17 DIAGNOSIS — S80.01XA CONTUSION OF RIGHT KNEE, INITIAL ENCOUNTER: Primary | ICD-10-CM

## 2018-05-17 DIAGNOSIS — M17.11 ARTHRITIS OF RIGHT KNEE: ICD-10-CM

## 2018-05-17 PROCEDURE — 99243 OFF/OP CNSLTJ NEW/EST LOW 30: CPT | Performed by: ORTHOPAEDIC SURGERY

## 2018-05-17 RX ORDER — MULTIVITAMIN WITH IRON
TABLET ORAL
COMMUNITY
End: 2019-07-16

## 2018-05-17 RX ORDER — CHOLECALCIFEROL (VITAMIN D3) 125 MCG
CAPSULE ORAL
COMMUNITY
End: 2021-10-28

## 2018-05-17 NOTE — PROGRESS NOTES
Patient Name: Cayla Lopez   YOB: 1960  Referring Primary Care Physician: Jonnie Crowell MD  BMI: Body mass index is 36.43 kg/m².    Chief Complaint:    Chief Complaint   Patient presents with   • Right Knee - Establish Care, Pain        Subjective:    HPI:   Cayla Lopez is a pleasant 57 y.o. year old who presents today for evaluation of   Chief Complaint   Patient presents with   • Right Knee - Establish Care, Pain   . Fell off curb in march landing on the knees. Right knee cntinues to hurt.  Stairs hard.  occas naproxen helps.  Radiates posteriorly    This problem is new to this examiner.     Medications:   Home Medications:  Current Outpatient Prescriptions on File Prior to Visit   Medication Sig   • gabapentin (NEURONTIN) 100 MG capsule Take 1 capsule by mouth Every Night.   • naproxen (NAPROSYN) 375 MG tablet Take 1 tablet by mouth 2 (Two) Times a Day As Needed for Moderate Pain .   • pramipexole (MIRAPEX) 1.5 MG tablet Take 1 tablet by mouth Every Night. TAKE ONE TABLET AT BEDTIME     No current facility-administered medications on file prior to visit.      Current Medications:  Scheduled Meds:  Continuous Infusions:  No current facility-administered medications for this visit.   PRN Meds:.    I have reviewed the patient's medical history in detail and updated the computerized patient record.  Review and summarization of old records includes:    Past Medical History:   Diagnosis Date   • MS (multiple sclerosis)       History reviewed. No pertinent surgical history.     Social History     Occupational History   • Not on file.     Social History Main Topics   • Smoking status: Former Smoker   • Smokeless tobacco: Never Used   • Alcohol use Yes      Comment: rarely   • Drug use: No   • Sexual activity: Defer      Social History     Social History Narrative   • No narrative on file        Family History   Problem Relation Age of Onset   • No Known Problems Mother    • No Known Problems Father  "   • Clotting disorder Sister    • No Known Problems Brother    • No Known Problems Maternal Aunt    • No Known Problems Maternal Uncle    • No Known Problems Paternal Aunt    • No Known Problems Paternal Uncle    • No Known Problems Maternal Grandmother    • No Known Problems Maternal Grandfather    • No Known Problems Paternal Grandmother    • No Known Problems Paternal Grandfather    • Anesthesia problems Neg Hx    • Broken bones Neg Hx    • Cancer Neg Hx    • Collagen disease Neg Hx    • Diabetes Neg Hx    • Dislocations Neg Hx    • Osteoporosis Neg Hx    • Rheumatologic disease Neg Hx    • Scoliosis Neg Hx    • Severe sprains Neg Hx        ROS: 14 point review of systems was performed and all other systems were reviewed and are negative except for documented findings in HPI and today's encounter.     Allergies: No Known Allergies  Constitutional:  Denies fever, shaking or chills   Eyes:  Denies change in visual acuity   HENT:  Denies nasal congestion or sore throat   Respiratory:  Denies cough or shortness of breath   Cardiovascular:  Denies chest pain or severe LE edema   GI:  Denies abdominal pain, nausea, vomiting, bloody stools or diarrhea   Musculoskeletal:  Numbness, tingling, pain, or loss of motor function only as noted above in history of present illness.  : Denies painful urination or hematuria  Integument:  Denies rash, lesion or ulceration   Neurologic:  Denies headache or focal weakness  Endocrine:  Denies lymphadenopathy  Psych:  Denies confusion or change in mental status   Hem:  Denies active bleeding    Subjective     Objective:    Physical Exam: 57 y.o. female  Wt Readings from Last 3 Encounters:   05/17/18 106 kg (232 lb 9.6 oz)   05/02/18 105 kg (232 lb)   04/20/18 106 kg (233 lb 12.8 oz)     Ht Readings from Last 3 Encounters:   05/17/18 170.2 cm (67\")   05/02/18 170.2 cm (67\")   04/20/18 170.2 cm (67\")     Body mass index is 36.43 kg/m².    Vitals:    05/17/18 0851   Temp: 97.7 °F (36.5 " °C)       Vital signs reviewed.   General Appearance:    Alert, cooperative, in no acute distress                  Eyes: conjunctiva clear  ENT: external ears and nose atraumatic  CV: no peripheral edema  Resp: normal respiratory effort  Skin: no rashes or wounds; normal turgor  Psych: mood and affect appropriate  Lymph: no nodes appreciated  Neuro: gross sensation intact  Vascular:  Palpable peripheral pulse in noted extremity  Musculoskeletal Extremities: KNEE Exam: medial joint line tenderness with crepitation, synovitis, swelling, and joint effusion right knee.  Some lower leg judi swelling- chronic changes    Radiology:   Imaging done by outside location, images were personally viewed and discussed at length with the patient:    Indication: pain related symptoms,  Views: 3V  AP, LAT & OBLIQUE right knee(s)   Findings: moderate arthritis  Comparison views: not available      Assessment:     ICD-10-CM ICD-9-CM   1. Contusion of right knee, initial encounter S80.01XA 924.11   2. Arthritis of right knee M17.11 716.96        Procedures       Plan: Biomechanics of pertinent body area discussed.  Risks, benefits, alternatives, comparisons, and complications of accepted medicines, injections, recommendations, surgical procedures, and therapies explained and education provided in laymen's terms. The patient was given the opportunity to ask questions and they were answerved to their satisfaction.   Natural history and expected course of this patient's diagnosis discussed along with evaluation of therapies. Questions answered.  OTC analgesics as needed with dosage warning and instructions given.  Cryotherapy/brachy therapy as indicated with instructions.   This Consult is done at the request of this patient's PCP (as listed at the top of this note)  to whom I will send this report with this rendered opinion.  Rest, ice, compression, and elevation (RICE) therapy.  Biomechanics and proper use of ambulatory aids:  crutches,  walker, cane, &/or trekking poles.  Naproxen and handout    5/17/2018

## 2018-05-17 NOTE — PATIENT INSTRUCTIONS
Knee Exercises  Ask your health care provider which exercises are safe for you. Do exercises exactly as told by your health care provider and adjust them as directed. It is normal to feel mild stretching, pulling, tightness, or discomfort as you do these exercises, but you should stop right away if you feel sudden pain or your pain gets worse. Do not begin these exercises until told by your health care provider.  STRETCHING AND RANGE OF MOTION EXERCISES   These exercises warm up your muscles and joints and improve the movement and flexibility of your knee. These exercises also help to relieve pain, numbness, and tingling.  Exercise A: Knee Extension, Prone   1. Lie on your abdomen on a bed.  2. Place your left / right knee just beyond the edge of the surface so your knee is not on the bed. You can put a towel under your left / right thigh just above your knee for comfort.  3. Relax your leg muscles and allow gravity to straighten your knee. You should feel a stretch behind your left / right knee.  4. Hold this position for __________ seconds.  5. Scoot up so your knee is supported between repetitions.  Repeat __________ times. Complete this stretch __________ times a day.  Exercise B: Knee Flexion, Active     1. Lie on your back with both knees straight. If this causes back discomfort, bend your left / right knee so your foot is flat on the floor.  2. Slowly slide your left / right heel back toward your buttocks until you feel a gentle stretch in the front of your knee or thigh.  3. Hold this position for __________ seconds.  4. Slowly slide your left / right heel back to the starting position.  Repeat __________ times. Complete this exercise __________ times a day.  Exercise C: Quadriceps, Prone     1. Lie on your abdomen on a firm surface, such as a bed or padded floor.  2. Bend your left / right knee and hold your ankle. If you cannot reach your ankle or pant leg, loop a belt around your foot and grab the belt  instead.  3. Gently pull your heel toward your buttocks. Your knee should not slide out to the side. You should feel a stretch in the front of your thigh and knee.  4. Hold this position for __________ seconds.  Repeat __________ times. Complete this stretch __________ times a day.  Exercise D: Hamstring, Supine   1. Lie on your back.  2. Loop a belt or towel over the ball of your left / right foot. The ball of your foot is on the walking surface, right under your toes.  3. Straighten your left / right knee and slowly pull on the belt to raise your leg until you feel a gentle stretch behind your knee.  ¨ Do not let your left / right knee bend while you do this.  ¨ Keep your other leg flat on the floor.  4. Hold this position for __________ seconds.  Repeat __________ times. Complete this stretch __________ times a day.  STRENGTHENING EXERCISES   These exercises build strength and endurance in your knee. Endurance is the ability to use your muscles for a long time, even after they get tired.  Exercise E: Quadriceps, Isometric     1. Lie on your back with your left / right leg extended and your other knee bent. Put a rolled towel or small pillow under your knee if told by your health care provider.  2. Slowly tense the muscles in the front of your left / right thigh. You should see your kneecap slide up toward your hip or see increased dimpling just above the knee. This motion will push the back of the knee toward the floor.  3. For __________ seconds, keep the muscle as tight as you can without increasing your pain.  4. Relax the muscles slowly and completely.  Repeat __________ times. Complete this exercise __________ times a day.  Exercise F: Straight Leg Raises - Quadriceps   1. Lie on your back with your left / right leg extended and your other knee bent.  2. Tense the muscles in the front of your left / right thigh. You should see your kneecap slide up or see increased dimpling just above the knee. Your thigh  may even shake a bit.  3. Keep these muscles tight as you raise your leg 4-6 inches (10-15 cm) off the floor. Do not let your knee bend.  4. Hold this position for __________ seconds.  5. Keep these muscles tense as you lower your leg.  6. Relax your muscles slowly and completely after each repetition.  Repeat __________ times. Complete this exercise __________ times a day.  Exercise G: Hamstring, Isometric   1. Lie on your back on a firm surface.  2. Bend your left / right knee approximately __________ degrees.  3. Dig your left / right heel into the surface as if you are trying to pull it toward your buttocks. Tighten the muscles in the back of your thighs to dig as hard as you can without increasing any pain.  4. Hold this position for __________ seconds.  5. Release the tension gradually and allow your muscles to relax completely for __________ seconds after each repetition.  Repeat __________ times. Complete this exercise __________ times a day.  Exercise H: Hamstring Curls     If told by your health care provider, do this exercise while wearing ankle weights. Begin with __________ weights. Then increase the weight by 1 lb (0.5 kg) increments. Do not wear ankle weights that are more than __________.  1. Lie on your abdomen with your legs straight.  2. Bend your left / right knee as far as you can without feeling pain. Keep your hips flat against the floor.  3. Hold this position for __________ seconds.  4. Slowly lower your leg to the starting position.  Repeat __________ times. Complete this exercise __________ times a day.  Exercise I: Squats (Quadriceps)   1.  front of a table, with your feet and knees pointing straight ahead. You may rest your hands on the table for balance but not for support.  2. Slowly bend your knees and lower your hips like you are going to sit in a chair.  ¨ Keep your weight over your heels, not over your toes.  ¨ Keep your lower legs upright so they are parallel with the  table legs.  ¨ Do not let your hips go lower than your knees.  ¨ Do not bend lower than told by your health care provider.  ¨ If your knee pain increases, do not bend as low.  3. Hold the squat position for __________ seconds.  4. Slowly push with your legs to return to standing. Do not use your hands to pull yourself to standing.  Repeat __________ times. Complete this exercise __________ times a day.  Exercise J: Wall Slides (Quadriceps)     1. Lean your back against a smooth wall or door while you walk your feet out 18-24 inches (46-61 cm) from it.  2. Place your feet hip-width apart.  3. Slowly slide down the wall or door until your knees bend __________ degrees. Keep your knees over your heels, not over your toes. Keep your knees in line with your hips.  4. Hold for __________ seconds.  Repeat __________ times. Complete this exercise __________ times a day.  Exercise K: Straight Leg Raises - Hip Abductors   1. Lie on your side with your left / right leg in the top position. Lie so your head, shoulder, knee, and hip line up. You may bend your bottom knee to help you keep your balance.  2. Roll your hips slightly forward so your hips are stacked directly over each other and your left / right knee is facing forward.  3. Leading with your heel, lift your top leg 4-6 inches (10-15 cm). You should feel the muscles in your outer hip lifting.  ¨ Do not let your foot drift forward.  ¨ Do not let your knee roll toward the ceiling.  4. Hold this position for __________ seconds.  5. Slowly return your leg to the starting position.  6. Let your muscles relax completely after each repetition.  Repeat __________ times. Complete this exercise __________ times a day.  Exercise L: Straight Leg Raises - Hip Extensors   1. Lie on your abdomen on a firm surface. You can put a pillow under your hips if that is more comfortable.  2. Tense the muscles in your buttocks and lift your left / right leg about 4-6 inches (10-15 cm). Keep  your knee straight as you lift your leg.  3. Hold this position for __________ seconds.  4. Slowly lower your leg to the starting position.  5. Let your leg relax completely after each repetition.  Repeat __________ times. Complete this exercise __________ times a day.  This information is not intended to replace advice given to you by your health care provider. Make sure you discuss any questions you have with your health care provider.  Document Released: 11/01/2006 Document Revised: 09/11/2017 Document Reviewed: 10/23/2016  [employer]vier Interactive Patient Education © 2017 [employer]

## 2018-06-06 ENCOUNTER — TELEPHONE (OUTPATIENT)
Dept: FAMILY MEDICINE CLINIC | Facility: CLINIC | Age: 58
End: 2018-06-06

## 2018-06-06 RX ORDER — GABAPENTIN 100 MG/1
100 CAPSULE ORAL NIGHTLY
Qty: 30 CAPSULE | Refills: 5 | Status: SHIPPED | OUTPATIENT
Start: 2018-06-06 | End: 2019-05-24

## 2018-07-02 RX ORDER — PRAMIPEXOLE DIHYDROCHLORIDE 1.5 MG/1
1.5 TABLET ORAL NIGHTLY
Qty: 30 TABLET | Refills: 3 | Status: SHIPPED | OUTPATIENT
Start: 2018-07-02 | End: 2018-11-06 | Stop reason: SDUPTHER

## 2018-08-10 RX ORDER — NAPROXEN 375 MG/1
375 TABLET ORAL 2 TIMES DAILY PRN
Qty: 180 TABLET | Refills: 0 | Status: SHIPPED | OUTPATIENT
Start: 2018-08-10 | End: 2018-11-07 | Stop reason: SDUPTHER

## 2018-09-07 ENCOUNTER — OFFICE VISIT (OUTPATIENT)
Dept: FAMILY MEDICINE CLINIC | Facility: CLINIC | Age: 58
End: 2018-09-07

## 2018-09-07 VITALS
BODY MASS INDEX: 37.04 KG/M2 | SYSTOLIC BLOOD PRESSURE: 124 MMHG | HEIGHT: 67 IN | HEART RATE: 79 BPM | TEMPERATURE: 97.9 F | OXYGEN SATURATION: 98 % | RESPIRATION RATE: 16 BRPM | DIASTOLIC BLOOD PRESSURE: 78 MMHG | WEIGHT: 236 LBS

## 2018-09-07 DIAGNOSIS — Z00.00 ENCOUNTER FOR PREVENTIVE HEALTH EXAMINATION: Primary | ICD-10-CM

## 2018-09-07 LAB
ALBUMIN SERPL-MCNC: 4.6 G/DL (ref 3.5–5.2)
ALBUMIN/GLOB SERPL: 2.1 G/DL
ALP SERPL-CCNC: 93 U/L (ref 39–117)
ALT SERPL-CCNC: 17 U/L (ref 1–33)
AST SERPL-CCNC: 20 U/L (ref 1–32)
BASOPHILS # BLD AUTO: 0.03 10*3/MM3 (ref 0–0.2)
BASOPHILS NFR BLD AUTO: 0.4 % (ref 0–1.5)
BILIRUB SERPL-MCNC: 1 MG/DL (ref 0.1–1.2)
BUN SERPL-MCNC: 26 MG/DL (ref 6–20)
BUN/CREAT SERPL: 28.3 (ref 7–25)
CALCIUM SERPL-MCNC: 9.2 MG/DL (ref 8.6–10.5)
CHLORIDE SERPL-SCNC: 108 MMOL/L (ref 98–107)
CHOLEST SERPL-MCNC: 209 MG/DL (ref 0–200)
CO2 SERPL-SCNC: 25.4 MMOL/L (ref 22–29)
CREAT SERPL-MCNC: 0.92 MG/DL (ref 0.57–1)
EOSINOPHIL # BLD AUTO: 0.1 10*3/MM3 (ref 0–0.7)
EOSINOPHIL NFR BLD AUTO: 1.4 % (ref 0.3–6.2)
ERYTHROCYTE [DISTWIDTH] IN BLOOD BY AUTOMATED COUNT: 14.4 % (ref 11.7–13)
GLOBULIN SER CALC-MCNC: 2.2 GM/DL
GLUCOSE SERPL-MCNC: 90 MG/DL (ref 65–99)
HCT VFR BLD AUTO: 40.7 % (ref 35.6–45.5)
HDLC SERPL-MCNC: 73 MG/DL (ref 40–60)
HGB BLD-MCNC: 12.7 G/DL (ref 11.9–15.5)
IMM GRANULOCYTES # BLD: 0.01 10*3/MM3 (ref 0–0.03)
IMM GRANULOCYTES NFR BLD: 0.1 % (ref 0–0.5)
LDLC SERPL CALC-MCNC: 126 MG/DL (ref 0–100)
LYMPHOCYTES # BLD AUTO: 2.61 10*3/MM3 (ref 0.9–4.8)
LYMPHOCYTES NFR BLD AUTO: 37.6 % (ref 19.6–45.3)
MCH RBC QN AUTO: 28.6 PG (ref 26.9–32)
MCHC RBC AUTO-ENTMCNC: 31.2 G/DL (ref 32.4–36.3)
MCV RBC AUTO: 91.7 FL (ref 80.5–98.2)
MONOCYTES # BLD AUTO: 0.42 10*3/MM3 (ref 0.2–1.2)
MONOCYTES NFR BLD AUTO: 6 % (ref 5–12)
NEUTROPHILS # BLD AUTO: 3.79 10*3/MM3 (ref 1.9–8.1)
NEUTROPHILS NFR BLD AUTO: 54.6 % (ref 42.7–76)
PLATELET # BLD AUTO: 231 10*3/MM3 (ref 140–500)
POTASSIUM SERPL-SCNC: 4.8 MMOL/L (ref 3.5–5.2)
PROT SERPL-MCNC: 6.8 G/DL (ref 6–8.5)
RBC # BLD AUTO: 4.44 10*6/MM3 (ref 3.9–5.2)
SODIUM SERPL-SCNC: 145 MMOL/L (ref 136–145)
T4 FREE SERPL-MCNC: 1.09 NG/DL (ref 0.93–1.7)
TRIGL SERPL-MCNC: 51 MG/DL (ref 0–150)
TSH SERPL DL<=0.005 MIU/L-ACNC: 2.03 MIU/ML (ref 0.27–4.2)
VLDLC SERPL CALC-MCNC: 10.2 MG/DL (ref 5–40)
WBC # BLD AUTO: 6.95 10*3/MM3 (ref 4.5–10.7)

## 2018-09-07 PROCEDURE — 99396 PREV VISIT EST AGE 40-64: CPT | Performed by: INTERNAL MEDICINE

## 2018-09-07 NOTE — PROGRESS NOTES
Subjective complaint is annual physical exam  Cayla Lopez is a 57 y.o. female.     History of Present Illness   Cayla is here today for her annual physical exam.  She basically has been doing well.  She does have a history of multiple sclerosis.  She is not very compliant with follow-up in terms of her neurologist.  She is not on any current immunotherapy for this.  She refused a colonoscopy last year.  She still does not want to get one.  We did discuss Cologuard testing and she will look into.  There is no strong family history of colon cancer.  She is not up-to-date on her mammograms and Pap smears.  She does see Dr. Muro and I encouraged her to make an appointment.  The following portions of the patient's history were reviewed and updated as appropriate: allergies, current medications, past family history, past medical history, past social history, past surgical history and problem list.    Review of Systems   Constitutional: Negative for activity change, appetite change, unexpected weight gain and unexpected weight loss.   HENT: Negative for congestion, sinus pressure, sore throat and trouble swallowing.    Respiratory: Negative for cough, chest tightness and shortness of breath.    Cardiovascular: Negative for chest pain and palpitations.   Gastrointestinal: Negative for abdominal pain, blood in stool, constipation and diarrhea.   Genitourinary: Negative for dysuria and hematuria.   Musculoskeletal: Positive for arthralgias.   Skin: Negative.    Neurological:        She has not had any recent MS exacerbations.   Psychiatric/Behavioral: Negative.        Objective   Physical Exam   Constitutional: She is oriented to person, place, and time. She appears well-developed and well-nourished.   HENT:   Tympanic membranes are normal.  There is some mild nasal congestion.  Oral pharynx is clear.   Eyes: Pupils are equal, round, and reactive to light. Conjunctivae and EOM are normal. No scleral icterus.   Funduscopic  exam reveals no papilledema   Neck: No JVD present. No thyromegaly present.   Cardiovascular: Normal rate, regular rhythm, normal heart sounds and intact distal pulses.  Exam reveals no gallop and no friction rub.    No murmur heard.  Pulmonary/Chest: Effort normal and breath sounds normal. No respiratory distress. She has no wheezes. She has no rales.   Abdominal: Soft. Bowel sounds are normal. She exhibits no distension and no mass. There is no tenderness. There is no guarding.   Musculoskeletal: Normal range of motion. She exhibits no edema.   Lymphadenopathy:     She has no cervical adenopathy.   Neurological: She is alert and oriented to person, place, and time. She displays normal reflexes. No cranial nerve deficit.   Skin: Skin is warm and dry.   Psychiatric: She has a normal mood and affect.   Nursing note and vitals reviewed.        Assessment/Plan   Cayla was seen today for annual exam.    Diagnoses and all orders for this visit:    Encounter for preventive health examination  -     CBC & Differential  -     Comprehensive Metabolic Panel  -     TSH+Free T4  -     Lipid Panel     Cayla is here today for her annual exam.  All in all things checked out well.  I did encourage her to look into whether her insurance will cover Cologuard testing.  She assures me that she will make her appointment with her gynecologist for her routine exams there.

## 2018-09-10 RX ORDER — GABAPENTIN 100 MG/1
CAPSULE ORAL
Qty: 30 CAPSULE | Refills: 0 | OUTPATIENT
Start: 2018-09-10

## 2018-11-06 RX ORDER — PRAMIPEXOLE DIHYDROCHLORIDE 1.5 MG/1
1.5 TABLET ORAL NIGHTLY
Qty: 30 TABLET | Refills: 3 | Status: SHIPPED | OUTPATIENT
Start: 2018-11-06 | End: 2019-03-04 | Stop reason: SDUPTHER

## 2018-11-07 RX ORDER — NAPROXEN 375 MG/1
375 TABLET ORAL 2 TIMES DAILY PRN
Qty: 180 TABLET | Refills: 0 | Status: SHIPPED | OUTPATIENT
Start: 2018-11-07 | End: 2019-07-16 | Stop reason: DRUGHIGH

## 2019-01-10 ENCOUNTER — OFFICE VISIT (OUTPATIENT)
Dept: FAMILY MEDICINE CLINIC | Facility: CLINIC | Age: 59
End: 2019-01-10

## 2019-01-10 VITALS
BODY MASS INDEX: 36.98 KG/M2 | SYSTOLIC BLOOD PRESSURE: 120 MMHG | TEMPERATURE: 98.1 F | OXYGEN SATURATION: 99 % | WEIGHT: 235.6 LBS | HEART RATE: 73 BPM | HEIGHT: 67 IN | DIASTOLIC BLOOD PRESSURE: 70 MMHG

## 2019-01-10 DIAGNOSIS — M50.90 CERVICAL DISC DISEASE: ICD-10-CM

## 2019-01-10 DIAGNOSIS — M54.16 LUMBAR BACK PAIN WITH RADICULOPATHY AFFECTING LOWER EXTREMITY: ICD-10-CM

## 2019-01-10 DIAGNOSIS — M54.2 NECK PAIN: Primary | ICD-10-CM

## 2019-01-10 PROCEDURE — 99214 OFFICE O/P EST MOD 30 MIN: CPT | Performed by: INTERNAL MEDICINE

## 2019-01-10 RX ORDER — BACLOFEN 10 MG/1
10 TABLET ORAL 3 TIMES DAILY
Qty: 30 TABLET | Refills: 1 | Status: SHIPPED | OUTPATIENT
Start: 2019-01-10 | End: 2019-01-29 | Stop reason: SDUPTHER

## 2019-01-10 RX ORDER — METHYLPREDNISOLONE 4 MG/1
TABLET ORAL
Qty: 21 TABLET | Refills: 0 | Status: SHIPPED | OUTPATIENT
Start: 2019-01-10 | End: 2019-01-17

## 2019-01-10 NOTE — PROGRESS NOTES
Subjective chief complaint is neck and back pain  Cayla Lopez is a 58 y.o. female.     History of Present Illness   Cayla is here today for complaints of moderate tosevere neck and back pain.  This is been present for approximately 3-4 weeks.  The neck pain is located in the midline.  It does extend into the trapezius.  It is making it hard for the patient to sleep at night.  I did review an MRI scan of the cervical spine that she had for her multiple sclerosis in 2015.  This did show degenerative changes as well as some mild canal stenosis at C5-C6.  She is complaining of some low back pain.  This is radiating to both groin areas.  She has not had any falls or recent injuries.  Proximal it is not helping.  Past medical history is remarkable for multiple sclerosis.  The following portions of the patient's history were reviewed and updated as appropriate: allergies, current medications, past medical history and problem list.    Review of Systems   Constitutional: Negative for chills and fever.   Musculoskeletal: Positive for back pain, neck pain and neck stiffness.       Objective   Physical Exam   Constitutional: She appears well-developed and well-nourished.   Neck:   He has diminished lateral rotation and extension of the neck.  There is tenderness along the posterior spinous processes and paraspinous muscles.  She also has some tenderness extending into the trapezius worse on the left than the right.   Musculoskeletal:   She is tender in both sacroiliac joints.  There is also some paraspinous muscle tenderness.   Nursing note and vitals reviewed.        Assessment/Plan   Cayla was seen today for pain.    Diagnoses and all orders for this visit:    Neck pain    Cervical disc disease    Lumbar back pain with radiculopathy affecting lower extremity    Other orders  -     baclofen (LIORESAL) 10 MG tablet; Take 1 tablet by mouth 3 (Three) Times a Day.  -     MethylPREDNISolone (MEDROL, MARY,) 4 MG tablet; Take as directed  on package instructions.      Cayla is here today for neck and back pain.  I suspect this is going to be some progression of her cervical disc disease and she may have some similar problems in her lumbar spine.  I'm going to treat her with a Medrol Dosepak and some baclofen.  She is going to see me back in one week.  If she is no better we may consider some scans at that time were some physical therapy.

## 2019-01-17 ENCOUNTER — OFFICE VISIT (OUTPATIENT)
Dept: FAMILY MEDICINE CLINIC | Facility: CLINIC | Age: 59
End: 2019-01-17

## 2019-01-17 VITALS
HEIGHT: 67 IN | DIASTOLIC BLOOD PRESSURE: 80 MMHG | BODY MASS INDEX: 36.88 KG/M2 | HEART RATE: 72 BPM | TEMPERATURE: 97.5 F | SYSTOLIC BLOOD PRESSURE: 134 MMHG | OXYGEN SATURATION: 98 % | WEIGHT: 235 LBS

## 2019-01-17 DIAGNOSIS — M54.6 CHRONIC MIDLINE THORACIC BACK PAIN: ICD-10-CM

## 2019-01-17 DIAGNOSIS — G89.29 CHRONIC MIDLINE THORACIC BACK PAIN: ICD-10-CM

## 2019-01-17 DIAGNOSIS — M54.2 NECK PAIN: Primary | ICD-10-CM

## 2019-01-17 PROCEDURE — 99213 OFFICE O/P EST LOW 20 MIN: CPT | Performed by: INTERNAL MEDICINE

## 2019-01-17 NOTE — PROGRESS NOTES
Subjective chief complaint is follow-up for neck and back pain  Cayla Lopez is a 58 y.o. female.     History of Present Illness   Cayla is here today for follow-up.  She is having continuining neck and back pain.  This improved somewhat with the steroids given approximately one week ago.  She has not been able to take the baclofen 3 times daily.  She has only been able use it at night.  However over the last few days her symptoms are recurring.    The following portions of the patient's history were reviewed and updated as appropriate: allergies, current medications, past medical history and problem list.    Review of Systems   Musculoskeletal: Positive for myalgias and neck pain.   Skin: Negative for color change and rash.       Objective   Physical Exam   Musculoskeletal:   She exhibits some point tenderness in the C8 T1 area.  She has some further tenderness in the upper thoracic spine and paraspinous muscles in the interscapular area.   Neurological: She is alert.   Nursing note and vitals reviewed.        Assessment/Plan   Cayla was seen today for follow-up.    Diagnoses and all orders for this visit:    Neck pain  -     Ambulatory Referral to Physical Therapy Evaluate and treat; Heat; Soft Tissue Mobilizaton (deep friction); Stretching    Chronic midline thoracic back pain  -     Ambulatory Referral to Physical Therapy Evaluate and treat; Heat; Soft Tissue Mobilizaton (deep friction); Stretching     Cayla is here today for some neck and back pain.  I'm going to try some physical therapy.  She may continue to use the naproxen.  She can continue to use the baclofen at night.

## 2019-01-17 NOTE — PATIENT INSTRUCTIONS
Exercising to Lose Weight  Exercising can help you to lose weight. In order to lose weight through exercise, you need to do vigorous-intensity exercise. You can tell that you are exercising with vigorous intensity if you are breathing very hard and fast and cannot hold a conversation while exercising.  Moderate-intensity exercise helps to maintain your current weight. You can tell that you are exercising at a moderate level if you have a higher heart rate and faster breathing, but you are still able to hold a conversation.  How often should I exercise?  Choose an activity that you enjoy and set realistic goals. Your health care provider can help you to make an activity plan that works for you. Exercise regularly as directed by your health care provider. This may include:  · Doing resistance training twice each week, such as:  ? Push-ups.  ? Sit-ups.  ? Lifting weights.  ? Using resistance bands.  · Doing a given intensity of exercise for a given amount of time. Choose from these options:  ? 150 minutes of moderate-intensity exercise every week.  ? 75 minutes of vigorous-intensity exercise every week.  ? A mix of moderate-intensity and vigorous-intensity exercise every week.    Children, pregnant women, people who are out of shape, people who are overweight, and older adults may need to consult a health care provider for individual recommendations. If you have any sort of medical condition, be sure to consult your health care provider before starting a new exercise program.  What are some activities that can help me to lose weight?  · Walking at a rate of at least 4.5 miles an hour.  · Jogging or running at a rate of 5 miles per hour.  · Biking at a rate of at least 10 miles per hour.  · Lap swimming.  · Roller-skating or in-line skating.  · Cross-country skiing.  · Vigorous competitive sports, such as football, basketball, and soccer.  · Jumping rope.  · Aerobic dancing.  How can I be more active in my  day-to-day activities?  · Use the stairs instead of the elevator.  · Take a walk during your lunch break.  · If you drive, park your car farther away from work or school.  · If you take public transportation, get off one stop early and walk the rest of the way.  · Make all of your phone calls while standing up and walking around.  · Get up, stretch, and walk around every 30 minutes throughout the day.  What guidelines should I follow while exercising?  · Do not exercise so much that you hurt yourself, feel dizzy, or get very short of breath.  · Consult your health care provider prior to starting a new exercise program.  · Wear comfortable clothes and shoes with good support.  · Drink plenty of water while you exercise to prevent dehydration or heat stroke. Body water is lost during exercise and must be replaced.  · Work out until you breathe faster and your heart beats faster.  This information is not intended to replace advice given to you by your health care provider. Make sure you discuss any questions you have with your health care provider.  Document Released: 01/20/2012 Document Revised: 05/25/2017 Document Reviewed: 05/21/2015  [employer] Interactive Patient Education © 2018 [employer]      MyPlate from [employer]  The general, healthful diet is based on the 2010 Dietary Guidelines for Americans. The amount of food you need to eat from each food group depends on your age, sex, and level of physical activity and can be individualized by a dietitian. Go to ChooseMyPlate.gov for more information.  What do I need to know about the MyPlate plan?  · Enjoy your food, but eat less.  · Avoid oversized portions.  ? ½ of your plate should include fruits and vegetables.  ? ¼ of your plate should be grains.  ? ¼ of your plate should be protein.  Grains  · Make at least half of your grains whole grains.  · For a 2,000 calorie daily food plan, eat 6 oz every day.  · 1 oz is about 1 slice bread, 1 cup cereal, or ½ cup cooked  rice, cereal, or pasta.  Vegetables  · Make half your plate fruits and vegetables.  · For a 2,000 calorie daily food plan, eat 2½ cups every day.  · 1 cup is about 1 cup raw or cooked vegetables or vegetable juice or 2 cups raw leafy greens.  Fruits  · Make half your plate fruits and vegetables.  · For a 2,000 calorie daily food plan, eat 2 cups every day.  · 1 cup is about 1 cup fruit or 100% fruit juice or ½ cup dried fruit.  Protein  · For a 2,000 calorie daily food plan, eat 5½ oz every day.  · 1 oz is about 1 oz meat, poultry, or fish, ¼ cup cooked beans, 1 egg, 1 Tbsp peanut butter, or ½ oz nuts or seeds.  Dairy  · Switch to fat-free or low-fat (1%) milk.  · For a 2,000 calorie daily food plan, eat 3 cups every day.  · 1 cup is about 1 cup milk or yogurt or soy milk (soy beverage), 1½ oz natural cheese, or 2 oz processed cheese.  Fats, Oils, and Empty Calories  · Only small amounts of oils are recommended.  · Empty calories are calories from solid fats or added sugars.  · Compare sodium in foods like soup, bread, and frozen meals. Choose the foods with lower numbers.  · Drink water instead of sugary drinks.  What foods can I eat?  Grains  Whole grains such as whole wheat, quinoa, millet, and bulgur. Bread, rolls, and pasta made from whole grains. Brown or wild rice. Hot or cold cereals made from whole grains and without added sugar.  Vegetables  All fresh vegetables, especially fresh red, dark green, or orange vegetables. Peas and beans. Low-sodium frozen or canned vegetables prepared without added salt. Low-sodium vegetable juices.  Fruits  All fresh, frozen, and dried fruits. Canned fruit packed in water or fruit juice without added sugar. Fruit juices without added sugar.  Meats and Other Protein Sources  Boiled, baked, or grilled lean meat trimmed of fat. Skinless poultry. Fresh seafood and shellfish. Canned seafood packed in water. Unsalted nuts and unsalted nut butters. Tofu. Dried beans and pea.  Eggs.  Dairy  Low-fat or fat-free milk, yogurt, and cheeses.  Sweets and Desserts  Frozen desserts made from low-fat milk.  Fats and Oils  Olive, peanut, and canola oils and margarine. Salad dressing and mayonnaise made from these oils.  Other  Soups and casseroles made from allowed ingredients and without added fat or salt.  The items listed above may not be a complete list of recommended foods or beverages. Contact your dietitian for more options.  What foods are not recommended?  Grains  Sweetened, low-fiber cereals. Packaged baked goods. Snack crackers and chips. Cheese crackers, butter crackers, and biscuits. Frozen waffles, sweet breads, doughnuts, pastries, packaged baking mixes, pancakes, cakes, and cookies.  Vegetables  Regular canned or frozen vegetables or vegetables prepared with salt. Canned tomatoes. Canned tomato sauce. Fried vegetables. Vegetables in cream sauce or cheese sauce.  Fruits  Fruits packed in syrup or made with added sugar.  Meats and Other Protein Sources  Marbled or fatty meats such as ribs. Poultry with skin. Fried meats, poultry, eggs, or fish. Sausages, hot dogs, and deli meats such as pastrami, bologna, or salami.  Dairy  Whole milk, cream, cheeses made from whole milk, sour cream. Ice cream or yogurt made from whole milk or with added sugar.  Beverages  For adults, no more than one alcoholic drink per day. Regular soft drinks or other sugary beverages. Juice drinks.  Sweets and Desserts  Sugary or fatty desserts, candy, and other sweets.  Fats and Oils  Solid shortening or partially hydrogenated oils. Solid margarine. Margarine that contains trans fats. Butter.  The items listed above may not be a complete list of foods and beverages to avoid. Contact your dietitian for more information.  This information is not intended to replace advice given to you by your health care provider. Make sure you discuss any questions you have with your health care provider.  Document Released:  01/06/2009 Document Revised: 05/25/2017 Document Reviewed: 11/26/2014  [employer] Interactive Patient Education © 2018 [employer]      Calorie Counting for Weight Loss  Calories are units of energy. Your body needs a certain amount of calories from food to keep you going throughout the day. When you eat more calories than your body needs, your body stores the extra calories as fat. When you eat fewer calories than your body needs, your body burns fat to get the energy it needs.  Calorie counting means keeping track of how many calories you eat and drink each day. Calorie counting can be helpful if you need to lose weight. If you make sure to eat fewer calories than your body needs, you should lose weight. Ask your health care provider what a healthy weight is for you.  For calorie counting to work, you will need to eat the right number of calories in a day in order to lose a healthy amount of weight per week. A dietitian can help you determine how many calories you need in a day and will give you suggestions on how to reach your calorie goal.  · A healthy amount of weight to lose per week is usually 1-2 lb (0.5-0.9 kg). This usually means that your daily calorie intake should be reduced by 500-750 calories.  · Eating 1,200 - 1,500 calories per day can help most women lose weight.  · Eating 1,500 - 1,800 calories per day can help most men lose weight.    What do I need to know about calorie counting?  In order to meet your daily calorie goal, you will need to:  · Find out how many calories are in each food you would like to eat. Try to do this before you eat.  · Decide how much of the food you plan to eat.  · Write down what you ate and how many calories it had. Doing this is called keeping a food log.    To successfully lose weight, it is important to balance calorie counting with a healthy lifestyle that includes regular activity. Aim for 150 minutes of moderate exercise (such as walking) or 75 minutes of vigorous  exercise (such as running) each week.  Where do I find calorie information?    The number of calories in a food can be found on a Nutrition Facts label. If a food does not have a Nutrition Facts label, try to look up the calories online or ask your dietitian for help.  Remember that calories are listed per serving. If you choose to have more than one serving of a food, you will have to multiply the calories per serving by the amount of servings you plan to eat. For example, the label on a package of bread might say that a serving size is 1 slice and that there are 90 calories in a serving. If you eat 1 slice, you will have eaten 90 calories. If you eat 2 slices, you will have eaten 180 calories.  How do I keep a food log?  Immediately after each meal, record the following information in your food log:  · What you ate. Don't forget to include toppings, sauces, and other extras on the food.  · How much you ate. This can be measured in cups, ounces, or number of items.  · How many calories each food and drink had.  · The total number of calories in the meal.    Keep your food log near you, such as in a small notebook in your pocket, or use a mobile hattie or website. Some programs will calculate calories for you and show you how many calories you have left for the day to meet your goal.  What are some calorie counting tips?  · Use your calories on foods and drinks that will fill you up and not leave you hungry:  ? Some examples of foods that fill you up are nuts and nut butters, vegetables, lean proteins, and high-fiber foods like whole grains. High-fiber foods are foods with more than 5 g fiber per serving.  ? Drinks such as sodas, specialty coffee drinks, alcohol, and juices have a lot of calories, yet do not fill you up.  · Eat nutritious foods and avoid empty calories. Empty calories are calories you get from foods or beverages that do not have many vitamins or protein, such as candy, sweets, and soda. It is better to  "have a nutritious high-calorie food (such as an avocado) than a food with few nutrients (such as a bag of chips).  · Know how many calories are in the foods you eat most often. This will help you calculate calorie counts faster.  · Pay attention to calories in drinks. Low-calorie drinks include water and unsweetened drinks.  · Pay attention to nutrition labels for \"low fat\" or \"fat free\" foods. These foods sometimes have the same amount of calories or more calories than the full fat versions. They also often have added sugar, starch, or salt, to make up for flavor that was removed with the fat.  · Find a way of tracking calories that works for you. Get creative. Try different apps or programs if writing down calories does not work for you.  What are some portion control tips?  · Know how many calories are in a serving. This will help you know how many servings of a certain food you can have.  · Use a measuring cup to measure serving sizes. You could also try weighing out portions on a kitchen scale. With time, you will be able to estimate serving sizes for some foods.  · Take some time to put servings of different foods on your favorite plates, bowls, and cups so you know what a serving looks like.  · Try not to eat straight from a bag or box. Doing this can lead to overeating. Put the amount you would like to eat in a cup or on a plate to make sure you are eating the right portion.  · Use smaller plates, glasses, and bowls to prevent overeating.  · Try not to multitask (for example, watch TV or use your computer) while eating. If it is time to eat, sit down at a table and enjoy your food. This will help you to know when you are full. It will also help you to be aware of what you are eating and how much you are eating.  What are tips for following this plan?  Reading food labels  · Check the calorie count compared to the serving size. The serving size may be smaller than what you are used to eating.  · Check the " source of the calories. Make sure the food you are eating is high in vitamins and protein and low in saturated and trans fats.  Shopping  · Read nutrition labels while you shop. This will help you make healthy decisions before you decide to purchase your food.  · Make a grocery list and stick to it.  Cooking  · Try to cook your favorite foods in a healthier way. For example, try baking instead of frying.  · Use low-fat dairy products.  Meal planning  · Use more fruits and vegetables. Half of your plate should be fruits and vegetables.  · Include lean proteins like poultry and fish.  How do I count calories when eating out?  · Ask for smaller portion sizes.  · Consider sharing an entree and sides instead of getting your own entree.  · If you get your own entree, eat only half. Ask for a box at the beginning of your meal and put the rest of your entree in it so you are not tempted to eat it.  · If calories are listed on the menu, choose the lower calorie options.  · Choose dishes that include vegetables, fruits, whole grains, low-fat dairy products, and lean protein.  · Choose items that are boiled, broiled, grilled, or steamed. Stay away from items that are buttered, battered, fried, or served with cream sauce. Items labeled “crispy” are usually fried, unless stated otherwise.  · Choose water, low-fat milk, unsweetened iced tea, or other drinks without added sugar. If you want an alcoholic beverage, choose a lower calorie option such as a glass of wine or light beer.  · Ask for dressings, sauces, and syrups on the side. These are usually high in calories, so you should limit the amount you eat.  · If you want a salad, choose a garden salad and ask for grilled meats. Avoid extra toppings like mon[place], cheese, or fried items. Ask for the dressing on the side, or ask for olive oil and vinegar or lemon to use as dressing.  · Estimate how many servings of a food you are given. For example, a serving of cooked rice is ½ cup  or about the size of half a baseball. Knowing serving sizes will help you be aware of how much food you are eating at restaurants. The list below tells you how big or small some common portion sizes are based on everyday objects:  ? 1 oz--4 stacked dice.  ? 3 oz--1 deck of cards.  ? 1 tsp--1 die.  ? 1 Tbsp--½ a ping-pong ball.  ? 2 Tbsp--1 ping-pong ball.  ? ½ cup--½ baseball.  ? 1 cup--1 baseball.  Summary  · Calorie counting means keeping track of how many calories you eat and drink each day. If you eat fewer calories than your body needs, you should lose weight.  · A healthy amount of weight to lose per week is usually 1-2 lb (0.5-0.9 kg). This usually means reducing your daily calorie intake by 500-750 calories.  · The number of calories in a food can be found on a Nutrition Facts label. If a food does not have a Nutrition Facts label, try to look up the calories online or ask your dietitian for help.  · Use your calories on foods and drinks that will fill you up, and not on foods and drinks that will leave you hungry.  · Use smaller plates, glasses, and bowls to prevent overeating.  This information is not intended to replace advice given to you by your health care provider. Make sure you discuss any questions you have with your health care provider.  Document Released: 12/18/2006 Document Revised: 11/17/2017 Document Reviewed: 11/17/2017  [employer] Interactive Patient Education © 2018 [employer]    Serving Sizes  A serving size is a measured amount of food or drink, such as one slice of bread, that has an associated nutrient content. Knowing the serving size of a food or drink can help you determine how much of that food you should consume.  What is the size of one serving?  The size of one healthy serving depends on the food or drink. To determine a serving size, read the food label. If the food or drink does not have a food label, try to find serving size information online. Or, use the following to  estimate the size of one adult serving:  Grain  1 slice bread. ½ bagel. ½ cup pasta.  Vegetable  ½ cup cooked or canned vegetables. 1 cup raw, leafy greens.  Fruit  ½ cup canned fruit. 1 medium fruit. ¼ cup dried fruit.  Meat and Other Protein Sources  1 oz meat, poultry, or fish. ¼ cup cooked beans. 1 egg. ¼ cup nuts or seeds. 1 Tbsp nut butter. ¼ cup tofu or tempeh. 2 Tbsp hummus.  Dairy  An individual container of yogurt (6-8 oz). 1 piece of cheese the size of your thumb (1 oz). 1 cup (8 oz) milk or milk alternative.  Fat  A piece the size of one dice. 1 tsp soft margarine. 1 Tbsp mayonnaise. 1 tsp vegetable oil. 1 Tbsp regular salad dressing. 2 Tbsp low-fat salad dressing.  How many servings should I eat from each food group each day?  The following are the suggested number of servings to try and have every day from each food group. You can also look at your eating throughout the week and aim for meeting these requirements on most days for overall healthy eating.  Grain  6-8 servings. Try to have half of your grains from whole grains, such as whole wheat bread, corn tortillas, oatmeal, brown rice, whole wheat pasta, and bulgur.  Vegetable  At least 2½-3 servings.  Fruit  2 servings.  Meat and Other Protein Foods  5-6 servings. Aim to have lean proteins, such as chicken, turkey, fish, beans, or tofu.  Dairy  3 servings. Choose low-fat or nonfat if you are trying to control your weight.  Fat  2-3 servings.  Is a serving the same thing as a portion?  No. A portion is the actual amount you eat, which may be more than one serving. Knowing the specific serving size of a food and the nutritional information that goes with it can help you make a healthy decision on what size portion to eat.  What are some tips to help me learn healthy serving sizes?  · Check food labels for serving sizes. Many foods that come as a single portion actually contain multiple servings.  · Determine the serving size of foods you commonly eat  and figure out how large a portion you usually eat.  · Measure the number of servings that can be held by the bowls, glasses, cups, and plates you typically use. For example, pour your breakfast cereal into your regular bowl and then pour it into a measuring cup.  · For 1-2 days, measure the serving sizes of all the foods you eat.  · Practice estimating serving sizes and determining how big your portions should be.  This information is not intended to replace advice given to you by your health care provider. Make sure you discuss any questions you have with your health care provider.  Document Released: 09/15/2004 Document Revised: 08/12/2017 Document Reviewed: 03/17/2015  [employer] Interactive Patient Education © 2018 [employer]

## 2019-01-23 ENCOUNTER — HOSPITAL ENCOUNTER (OUTPATIENT)
Dept: PHYSICAL THERAPY | Facility: HOSPITAL | Age: 59
Setting detail: THERAPIES SERIES
Discharge: HOME | End: 2019-01-23
Attending: INTERNAL MEDICINE

## 2019-01-23 DIAGNOSIS — R29.3 ABNORMAL POSTURE: ICD-10-CM

## 2019-01-23 DIAGNOSIS — M54.50 LUMBAR PAIN: Primary | ICD-10-CM

## 2019-01-23 DIAGNOSIS — M54.2 NECK PAIN: ICD-10-CM

## 2019-01-23 DIAGNOSIS — R29.898 WEAKNESS OF BOTH LOWER EXTREMITIES: ICD-10-CM

## 2019-01-23 PROCEDURE — 97110 THERAPEUTIC EXERCISES: CPT

## 2019-01-23 PROCEDURE — 97161 PT EVAL LOW COMPLEX 20 MIN: CPT

## 2019-01-23 NOTE — THERAPY EVALUATION
"    Outpatient Physical Therapy Ortho Initial Evaluation  [facility]     Patient Name: Cayla Lopez  : 1960  MRN: [id number]  Today's Date: 2019      Visit Date: 2019    Patient Active Problem List   Diagnosis   • Cervical radiculopathy   • Edema   • Ulnar nerve entrapment   • Fibromyalgia   • Insomnia   • Multiple sclerosis (CMS/HCC)   • Varicose veins of lower extremities   • Thumb tendonitis   • Osteoarthritis of right thumb   • Restless leg syndrome   • Primary osteoarthritis involving multiple joints   • Meningioma (CMS/HCC)   • Contusion of right knee   • Arthritis of right knee        Past Medical History:   Diagnosis Date   • MS (multiple sclerosis) (CMS/HCC)         No past surgical history on file.    Visit Dx:     ICD-10-CM ICD-9-CM   1. Lumbar pain M54.5 724.2   2. Neck pain M54.2 723.1   3. Weakness of both lower extremities R29.898 729.89   4. Abnormal posture R29.3 781.92       Patient History     Row Name 19 1000             History    Chief Complaint  Pain  -JA      Type of Pain  Neck pain;Back pain  -      Date Current Problem(s) Began  -- 1 month worsening  -      Brief Description of Current Complaint  Neck and back pain.  Has a high tolerance for pain but got to the point of not standing it any longer. Has been about a month, increasingly painful.  Last March was at [employer] in parking lot and missed the step down and landed on judi knees.  Has had \"feet issues\" for the last 2 years but it's getting better.  Last new years day fell forward off couch b/c she fell asleep.  Insidious onset of the neck and back. Likes to garden, crafts, painting. Also has restless leg.  Began having HA's not daily, about a week ago.  falls asleep in chair (head will hang down)  Stress fx on L, plantar fasciitis on R.  Boothe neuroma on top of L foot.  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  Yes  -JA      What position do you sleep in?  Right sidelying  -JA         Fall Risk Assessment    Any falls in the past year:  Yes  -JA         Services    Prior Rehab/Home Health Experiences  No  -JA      Are you currently receiving Home Health services  No  -JA      Do you plan to receive Home Health services in the near future  No  -JA         Daily Activities    Primary Language  English  -JA      How does patient learn best?  Reading  -JA      Barriers to learning  None  -JA      Recommended Referrals  Physical Therapy  -JA      Pt Participated in POC and Goals  Yes  -JA         Safety    Are you being hurt, hit, or frightened by anyone at home or in your life?  No  -JA      Are you being neglected by a caregiver  No  -JA        User Key  (r) = Recorded By, (t) = Taken By, (c) = Cosigned By    Initials Name Provider Type    Jeni Connor, PT Physical Therapist          PT Ortho     Row Name 01/23/19 1000       Posture/Observations    Posture/Observations Comments  scoliosis L convexity, neck sidebent R, R shld lower; sits w/wt shifted L; rounded shoulders; hyperlordotic posture  -JA       Quarter Clearing    Quarter Clearing  Lower Quarter Clearing;Upper Quarter Clearing  -JA       Myotomal Screen- Upper Quarter Clearing    Shoulder flexion (C5)  Bilateral:;WNL  -JA    Elbow flexion/wrist extension (C6)  Bilateral:;WNL  -JA    Elbow extension/wrist flexion (C7)  Bilateral:;WNL  -JA    Finger abduction (T1)   Bilateral:;WNL  -JA       Cervical/Shoulder ROM Screen    Cervical flexion  Impaired 75% no pain  -JA    Cervical extension  Impaired 75% no pain  -JA    Cervical lateral flexion  Impaired 50% judi but pain is only on R  -JA    Cervical rotation  Impaired L 75% pain L, R 100% but more painful 7/10  -JA    Shoulder elevation   -- WFL  -JA       Neural Tension Signs- Lower Quarter Clearing    SLR  Bilateral:;Negative  -JA       Myotomal Screen- Lower Quarter Clearing    Hip flexion (L2)  Right:;3+ (Fair +);Left:;4- (Good -) pain  -JA    Knee extension (L3)  Bilateral:;4- (Good -) pan in midline LB  -JA    Ankle DF (L4)  Right:;3+ (Fair +);Left:;4- (Good -)  -JA    Knee flexion (S2)  Right:;3+ (Fair +);Left:;4- (Good -)  -JA       Lumbar ROM Screen- Lower Quarter Clearing    Lumbar Flexion  Normal feels good  -JA    Lumbar Extension  Impaired 25%, hurts R; 8/10  -JA    Lumbar Lateral Flexion  Impaired  50% R hurts R, 25% L hurts L  -JA    Lumbar Rotation  Impaired 25% L rot hurts R, 50% R severe R  -JA       SI/Hip Screen- Lower Quarter Clearing    Jarod's/Nikhil's test  Right:;Positive;Left:;Negative  -JA       Special Tests/Palpation    Special Tests/Palpation  Cervical/Thoracic  -JA       Cervical Palpation    Cervical Palpation- Location?  Occiput;Suboccipital;SCM;SC joint;AC joint;Cervical facets;Scalenes;Spinous process;Temporalis;Levator scapula;Upper traps;Middle traps;Rhomboids;Lower traps  -    Occiput  Right:;Tender;Guarded/taut  -JA    Suboccipital  Right:;Tender;Guarded/taut  -JA    SCM  Right:;Tender;Guarded/taut  -JA       MMT (Manual Muscle Testing)    General MMT Comments  hip ER R 4/5, L3+/5; IR R3/5, L3+/5  -JA      User Key  (r) = Recorded By, (t) = Taken By, (c) = Cosigned By    Initials Name Provider Type    Jeni Connor, PT Physical Therapist                      Therapy Education  Education Details: Went over sitting posture, importance of lumbar support, practiced log roll  supine<->sit, and issued [place] Managing Back pain book. Instructed in 90/90 lumbar decompression positioning.  Will require further education and reinforcement.  Given: HEP, Symptoms/condition management, Pain management, Posture/body mechanics  Program: New  How Provided: Verbal, Demonstration, Written  Provided to: Patient  Level of Understanding: Teach back education performed     PT OP Goals     Row Name 01/23/19 1200          PT Short Term Goals    STG Date to Achieve  02/06/19  -SETH     STG 1  Patient will be independent and compliant with initial HEP   -SETH     STG 1 Progress  New  -SETH     STG 2  Pt will demonstrate correct posture in sitting and standing.  -JA     STG 2 Progress  New  -JA     STG 3  Pt will be able to move through 75% of full cervical AROM without increased pain.   -SETH     STG 3 Progress  New  -SETH        Long Term Goals    LTG Date to Achieve  02/23/19  -SETH     LTG 1  Pt will be independent with advanced HEP for spinal stabilization and UE/LE/core strengthening.  -SETH     LTG 1 Progress  New  -JA     LTG 2  Pt will demonstrate correct body mechanics with ADL's and work activity to decrease strain on spine.  -SETH     LTG 2 Progress  New  -JA     LTG 3  Pt will be able to move through 50-75% of full trunk ROM without increased pain   -SETH     LTG 3 Progress  New  -JA     LTG 4  Pt will score 44% on Oswestry Disability Index indicating decrease in perceived functional disability.   -SETH     LTG 4 Progress  New  -JA     LTG 5  Pt will score 34% or less on NDI indicating decrease in perceived functional disability.   -SETH     LTG 5 Progress  New  -SETH        Time Calculation    PT Goal Re-Cert Due Date  04/23/19  -SETH       User Key  (r) = Recorded By, (t) = Taken By, (c) = Cosigned By    Initials Name Provider Type    Jeni Connor, PT Physical Therapist          PT Assessment/Plan     Row Name 01/23/19 1200          PT Assessment    Functional Limitations  Impaired gait;Limitation in home  management;Limitations in community activities;Limitations in functional capacity and performance;Performance in leisure activities;Performance in self-care ADL  -     Impairments  Pain;Posture;Poor body mechanics;Range of motion;Muscle strength;Gait;Endurance  -     Assessment Comments  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  She reports she has had foot issues for the last 2 years that may have contributed to her back pain due to abnormal gait.  She has stable clinical presentation with posture deficits including scoliosis with L convexity and hyperlordosis in lumbar region, significantly limited trunk AROM, pain with AROM of cervical and lumbar spine, and weakness in hip girdle and core.  She has pain with transitional movements.  Her problem list impairs her ADL's and limits participation in leisure activity.  She would benefit from skilled therapy services.  -SETH     Please refer to paper survey for additional self-reported information  Yes  -JA     Rehab Potential  Good  -JA     Patient/caregiver participated in establishment of treatment plan and goals  Yes  -     Patient would benefit from skilled therapy intervention  Yes  -JA        PT Plan    PT Frequency  2x/week  -SETH     Predicted Duration of Therapy Intervention (Therapy Eval)  6-8 weeks (due to neck AND low back pain)  -SETH     Planned CPT's?  PT EVAL LOW COMPLEXITY: 04893;PT THER PROC EA 15 MIN: 26722;PT THER ACT EA 15 MIN: 67627;PT MANUAL THERAPY EA 15 MIN: 51296;PT NEUROMUSC RE-EDUCATION EA 15 MIN: 37375;PT AQUATIC THERAPY EA 15 MIN: 27726;PT SELF CARE/HOME MGMT/TRAIN EA 15: 07491;PT HOT OR COLD PACK TREAT MCARE;PT ELECTRICAL STIM UNATTEND: ;PT ULTRASOUND EA 15 MIN: 96295;PT TRACTION CERVICAL: 51529  -     PT Plan Comments  assess response and review HEP, add manual techniques (STM, stretching),  education for posture and body mechanics  -       User Key  (r) = Recorded By, (t) = Taken By, (c) = Cosigned By    Initials Name Provider Type    Jeni Connor, KEVEN Physical Therapist            Exercises     Row Name 01/23/19 1100             Total Minutes    40872 - PT Therapeutic Exercise Minutes  30  -JA         Exercise 1    Exercise Name 1  Educated/practiced log roll, other education (see in ed. tab)  -SETH      Time 1  10 min  -SETH        User Key  (r) = Recorded By, (t) = Taken By, (c) = Cosigned By    Initials Name Provider Type    Jeni Connor, PT Physical Therapist                        Outcome Measure Options: Neck Disability Index (NDI), Modifed Paulaestry  Modified Oswestry  Modified Oswestry Score/Comments: 54%  Neck Disability Index  Section 1 - Pain Intensity: The pain is fairly severe at the moment.  Section 2 - Personal Care: I can look after myself normally without causing extra pain.  Section 3 - Lifting: I can lift only very light weights.  Section 4 - Work: I can do as much work as I want.  Section 5 - Headaches: I have moderate headaches that come infrequently.  Section 6 - Concentration: I have a fair degree of difficulty concentrating.  Section 7 - Sleeping: My sleep is greatly disturbed for up to 3-5 hours.  Section 8 - Driving: I can drive as long as I want with moderate neck pain.  Section 9 - Reading: I can read as much as I want with moderate neck pain.  Section 10 - Recreation: I have neck pain with most recreational activities.  Neck Disability Index Score: 22  Neck Disability Index Comments: 44%      Time Calculation:     Therapy Suggested Charges     Code   Minutes Charges    07444 (CPT®) Hc Pt Neuromusc Re Education Ea 15 Min      49925 (CPT®) Hc Pt Ther Proc Ea 15 Min 30 2    70319 (CPT®) Hc Gait Training Ea 15 Min      32384 (CPT®) Hc Pt Therapeutic Act Ea 15 Min      73115 (CPT®) Hc Pt Manual Therapy Ea 15 Min      72366 (CPT®) Hc Pt Ther Massage- Per 15 Min       23030 (CPT®) Hc Pt Iontophoresis Ea 15 Min      18042 (CPT®) Hc Pt Elec Stim Ea-Per 15 Min      46262 (CPT®) Hc Pt Ultrasound Ea 15 Min      44495 (CPT®) Hc Pt Self Care/Mgmt/Train Ea 15 Min      35028 (CPT®) Hc Pt Prosthetic (S) Train Initial Encounter, Each 15 Min      49731 (CPT®) Hc Orthotic(S) Mgmt/Train Initial Encounter, Each 15min      07949 (CPT®) Hc Pt Aquatic Therapy Ea 15 Min      70448 (CPT®) Hc Pt Orthotic(S)/Prosthetic(S) Encounter, Each 15 Min       (CPT®) Hc Pt Electrical Stim Unattended      Total  30 2          Start Time: 1045  Stop Time: 1145  Time Calculation (min): 60 min     Therapy Charges for Today     Code Description Service Date Service Provider Modifiers Qty    48412848403 HC PT THER PROC EA 15 MIN 1/23/2019 Jeni French, PT GP 2    37723660923 HC PT EVAL LOW COMPLEXITY 2 1/23/2019 Jeni French, PT GP 1          PT G-Codes  Outcome Measure Options: Neck Disability Index (NDI), Chris Gunn  Modified Oswestry Score/Comments: 54%  Neck Disability Index Score: 22         Jeni French, PT  1/23/2019

## 2019-01-29 RX ORDER — BACLOFEN 10 MG/1
10 TABLET ORAL NIGHTLY
Qty: 30 TABLET | Refills: 1 | Status: SHIPPED | OUTPATIENT
Start: 2019-01-29 | End: 2019-05-24

## 2019-01-31 ENCOUNTER — HOSPITAL ENCOUNTER (OUTPATIENT)
Dept: PHYSICAL THERAPY | Facility: HOSPITAL | Age: 59
Setting detail: THERAPIES SERIES
Discharge: HOME | End: 2019-01-31
Attending: INTERNAL MEDICINE

## 2019-01-31 DIAGNOSIS — M54.50 LUMBAR PAIN: Primary | ICD-10-CM

## 2019-01-31 DIAGNOSIS — R29.898 WEAKNESS OF BOTH LOWER EXTREMITIES: ICD-10-CM

## 2019-01-31 DIAGNOSIS — R29.3 ABNORMAL POSTURE: ICD-10-CM

## 2019-01-31 DIAGNOSIS — M54.2 NECK PAIN: ICD-10-CM

## 2019-01-31 PROCEDURE — 97140 MANUAL THERAPY 1/> REGIONS: CPT

## 2019-01-31 PROCEDURE — 97110 THERAPEUTIC EXERCISES: CPT

## 2019-01-31 PROCEDURE — G0283 ELEC STIM OTHER THAN WOUND: HCPCS

## 2019-02-04 ENCOUNTER — APPOINTMENT (OUTPATIENT)
Dept: PHYSICAL THERAPY | Facility: HOSPITAL | Age: 59
End: 2019-02-04
Attending: INTERNAL MEDICINE

## 2019-02-04 ENCOUNTER — HOSPITAL ENCOUNTER (OUTPATIENT)
Dept: PHYSICAL THERAPY | Facility: HOSPITAL | Age: 59
Setting detail: THERAPIES SERIES
Discharge: HOME | End: 2019-02-04

## 2019-02-04 DIAGNOSIS — R29.3 ABNORMAL POSTURE: ICD-10-CM

## 2019-02-04 DIAGNOSIS — R29.898 WEAKNESS OF BOTH LOWER EXTREMITIES: ICD-10-CM

## 2019-02-04 DIAGNOSIS — M54.50 LUMBAR PAIN: Primary | ICD-10-CM

## 2019-02-04 DIAGNOSIS — M54.2 NECK PAIN: ICD-10-CM

## 2019-02-04 PROCEDURE — 97110 THERAPEUTIC EXERCISES: CPT

## 2019-02-04 PROCEDURE — G0283 ELEC STIM OTHER THAN WOUND: HCPCS

## 2019-02-04 PROCEDURE — 97140 MANUAL THERAPY 1/> REGIONS: CPT

## 2019-02-04 NOTE — THERAPY TREATMENT NOTE
Outpatient Physical Therapy Ortho Treatment Note  [facility]     Patient Name: Cayla Lopez  : 1960  MRN: [id number]  Today's Date: 2019      Visit Date: 2019    Visit Dx:    ICD-10-CM ICD-9-CM   1. Lumbar pain M54.5 724.2   2. Neck pain M54.2 723.1   3. Weakness of both lower extremities R29.898 729.89   4. Abnormal posture R29.3 781.92       Patient Active Problem List   Diagnosis   • Cervical radiculopathy   • Edema   • Ulnar nerve entrapment   • Fibromyalgia   • Insomnia   • Multiple sclerosis (CMS/HCC)   • Varicose veins of lower extremities   • Thumb tendonitis   • Osteoarthritis of right thumb   • Restless leg syndrome   • Primary osteoarthritis involving multiple joints   • Meningioma (CMS/HCC)   • Contusion of right knee   • Arthritis of right knee        Past Medical History:   Diagnosis Date   • MS (multiple sclerosis) (CMS/HCC)         No past surgical history on file.                    PT Assessment/Plan     Row Name 19 0800          PT Assessment    Assessment Comments  Pt was more active yesterday with cleaning and preparing for company and noted increased stiffness and pain this morning, rated 7/10 upon waking but it decreased to 4/10 after moving around.  She was more point tender in lower thoracic spine than lumbar spine today, likely just acute due to activities of yesterday.  She responded well to manual techniques and reports good response to estim last visit.  She will benefit from continued education for posture and body  mechanics.  Updated HEP to include SL thoracic rotation and alt bent-knee fallout with t.a. for stabilization.  -JA        PT Plan    PT Plan Comments  cont ther ex, manual, estim w/MH  -SETH       User Key  (r) = Recorded By, (t) = Taken By, (c) = Cosigned By    Initials Name Provider Type    Jeni Connor, PT Physical Therapist          Modalities     Row Name 19 0700             Moist Heat     Applied  Yes  -SETH      Location   LB and neck (w/estim), supine with legs over traction stool  -      Rx Minutes  15 mins  -JA         ELECTRICAL STIMULATION    Attended/Unattended  Unattended  -SETH      Stimulation Type  IFC  -JA      Max mAmp  8  -JA      Location/Electrode Placement/Other  judi lower thoracic pvm  -SETH       PT E-Stim Unattended (Manual) Minutes  15  -JA        User Key  (r) = Recorded By, (t) = Taken By, (c) = Cosigned By    Initials Name Provider Type    Jeni Connor, PT Physical Therapist          Exercises     Row Name 02/04/19 0800 02/04/19 0700          Subjective Comments    Subjective Comments  --  I did a lot of cleaning yesterday, probebly overdid.  I forgot to tell you I fell the weekend before last (landed on her R side, was really sore on R leg but it is better now.)  -SETH        Subjective Pain    Able to rate subjective pain?  --  yes  -SETH     Pre-Treatment Pain Level  --  4  -JA     Subjective Pain Comment  --  was 7/10 when she first got up  -SETH        Total Minutes    56433 - PT Therapeutic Exercise Minutes  --  30  -JA     00400 - PT Manual Therapy Minutes  15  -JA  --        Exercise 1    Exercise Name 1  --  Nustep  -JA     Time 1  --  5 min  -JA        Exercise 2    Exercise Name 2  --  Reverse shoulder rolls  -JA     Reps 2  --  10  -JA        Exercise 3    Exercise Name 3  --  scap ret  -JA     Reps 3  --  10  -JA     Time 3  --  3 sec  -JA        Exercise 4    Exercise Name 4  --  progressed to t.a. w/bent knee fallout   -JA     Reps 4  --  10  -JA     Time 4  --  3 sec  -JA        Exercise 5    Exercise Name 5  --  PPT w/t.a.  -JA     Reps 5  --  10  -JA     Time 5  --  3 sec  -JA        Exercise 6    Exercise Name 6  --  standing chin tuck  -JA     Reps 6  --  10  -JA     Time 6  --  3 sec  -JA        Exercise 7    Exercise Name 7  --  LTR  -JA     Reps 7  --  10  -JA     Time 7  --  5 sec  -JA        Exercise 8    Exercise Name 8  --  SKC  -JA     Reps 8  --  5  -JA     Time 8  --  10sec   -SETH       User Key  (r) = Recorded By, (t) = Taken By, (c) = Cosigned By    Initials Name Provider Type    Jeni Connor PT Physical Therapist                        Manual Rx (last 36 hours)      Manual Treatments     Row Name 02/04/19 0800             Total Minutes    60892 - PT Manual Therapy Minutes  15  -JA         Manual Rx 1    Manual Rx 1 Location  prone position for LB, thor, and neck  -SETH      Manual Rx 1 Type  STM, myofascial mobilization, gentle PA's; also posterior cerv pvm  -SETH      Manual Rx 1 Duration  15  -SETH        User Key  (r) = Recorded By, (t) = Taken By, (c) = Cosigned By    Initials Name Provider Type    Jeni Connor, PT Physical Therapist              Therapy Education  Education Details: updated HEP  Given: HEP, Symptoms/condition management, Pain management, Posture/body mechanics  Program: Progressed  How Provided: Verbal, Demonstration, Written  Provided to: Patient  Level of Understanding: Teach back education performed              Time Calculation:   Start Time: 0750  Stop Time: 0850  Time Calculation (min): 60 min  Therapy Suggested Charges     Code   Minutes Charges    17264 (CPT®) Hc Pt Neuromusc Re Education Ea 15 Min      50847 (CPT®) Hc Pt Ther Proc Ea 15 Min 30 2    07993 (CPT®) Hc Gait Training Ea 15 Min      55206 (CPT®) Hc Pt Therapeutic Act Ea 15 Min      18410 (CPT®) Hc Pt Manual Therapy Ea 15 Min 15 1    90517 (CPT®) Hc Pt Ther Massage- Per 15 Min      23243 (CPT®) Hc Pt Iontophoresis Ea 15 Min      58663 (CPT®) Hc Pt Elec Stim Ea-Per 15 Min      32538 (CPT®) Hc Pt Ultrasound Ea 15 Min      34154 (CPT®) Hc Pt Self Care/Mgmt/Train Ea 15 Min      73118 (CPT®) Hc Pt Prosthetic (S) Train Initial Encounter, Each 15 Min      05738 (CPT®) Hc Orthotic(S) Mgmt/Train Initial Encounter, Each 15min      67517 (CPT®) Hc Pt Aquatic Therapy Ea 15 Min      36253 (CPT®) Hc Pt Orthotic(S)/Prosthetic(S) Encounter, Each 15 Min       (CPT®) Hc Pt Electrical Stim  Unattended 15 1    Total  60 4        Therapy Charges for Today     Code Description Service Date Service Provider Modifiers Qty    44502138250 HC PT THER PROC EA 15 MIN 2/4/2019 Manolo, Jeni WALKER, PT GP 2    60239729849 HC PT MANUAL THERAPY EA 15 MIN 2/4/2019 Jeni French, PT GP 1    23726033494 HC PT ELECTRICAL STIM UNATTENDED 2/4/2019 Jeni French, PT  1    08305601601 HC PT HOT OR COLD PACK TREAT MCARE 2/4/2019 Jeni French, PT GP 1                    Jeni French, PT  2/4/2019

## 2019-02-07 ENCOUNTER — HOSPITAL ENCOUNTER (OUTPATIENT)
Dept: PHYSICAL THERAPY | Facility: HOSPITAL | Age: 59
Setting detail: THERAPIES SERIES
Discharge: HOME | End: 2019-02-07
Attending: INTERNAL MEDICINE

## 2019-02-07 DIAGNOSIS — R29.3 ABNORMAL POSTURE: ICD-10-CM

## 2019-02-07 DIAGNOSIS — M54.50 LUMBAR PAIN: Primary | ICD-10-CM

## 2019-02-07 DIAGNOSIS — R29.898 WEAKNESS OF BOTH LOWER EXTREMITIES: ICD-10-CM

## 2019-02-07 DIAGNOSIS — M54.2 NECK PAIN: ICD-10-CM

## 2019-02-07 PROCEDURE — 97110 THERAPEUTIC EXERCISES: CPT

## 2019-02-07 PROCEDURE — 97140 MANUAL THERAPY 1/> REGIONS: CPT

## 2019-02-07 PROCEDURE — G0283 ELEC STIM OTHER THAN WOUND: HCPCS

## 2019-02-07 NOTE — THERAPY TREATMENT NOTE
Outpatient Physical Therapy Ortho Treatment Note  [facility]     Patient Name: Cayla Lopez  : 1960  MRN: [id number]  Today's Date: 2019      Visit Date: 2019    Visit Dx:    ICD-10-CM ICD-9-CM   1. Lumbar pain M54.5 724.2   2. Neck pain M54.2 723.1   3. Weakness of both lower extremities R29.898 729.89   4. Abnormal posture R29.3 781.92       Patient Active Problem List   Diagnosis   • Cervical radiculopathy   • Edema   • Ulnar nerve entrapment   • Fibromyalgia   • Insomnia   • Multiple sclerosis (CMS/HCC)   • Varicose veins of lower extremities   • Thumb tendonitis   • Osteoarthritis of right thumb   • Restless leg syndrome   • Primary osteoarthritis involving multiple joints   • Meningioma (CMS/HCC)   • Contusion of right knee   • Arthritis of right knee        Past Medical History:   Diagnosis Date   • MS (multiple sclerosis) (CMS/HCC)         No past surgical history on file.                    PT Assessment/Plan     Row Name 19 0800          PT Assessment    Assessment Comments  Ms. Lopez reports decreased pain today.  She has a new desk at work and is setting it up to be ergonomically sound. She has tolerated small increase in activity without increased pain.  Following manual therpay noted increased soreness in L scapular regon that decreased with stretching in supine. She demonstrates progres toward therapy goals (see in goals) and would benefit form continuing skilled therapy.  -JA        PT Plan    PT Plan Comments  assess response to last visit (did scapular pain stop?), cont strengthening spine and core stabilizers.   -SETH       User Key  (r) = Recorded By, (t) = Taken By, (c) = Cosigned By    Initials Name Provider Type    Jeni Connor, PT Physical Therapist          Modalities     Row Name 19 0800             Moist Heat    MH Applied  Yes  -SETH      Location  LB and neck (w/estim), supine with legs over traction stool  -SETH      Rx Minutes  15 mins  -SETH          ELECTRICAL STIMULATION    Attended/Unattended  Unattended  -      Stimulation Type  IFC  -JA      Max mAmp  8  -JA      Location/Electrode Placement/Other  judi lower thoracic pvm  -JA       PT E-Stim Unattended (Manual) Minutes  15  -JA        User Key  (r) = Recorded By, (t) = Taken By, (c) = Cosigned By    Initials Name Provider Type    Jeni Connor, PT Physical Therapist          Exercises     Row Name 02/07/19 0900 02/07/19 0800          Subjective Comments    Subjective Comments  --  Pretty good today.  -SETH        Subjective Pain    Able to rate subjective pain?  --  yes  -SETH     Pre-Treatment Pain Level  --  3  -JA     Subjective Pain Comment  --  4-5/10 pain upon awakening  -        Total Minutes    45315 - PT Therapeutic Exercise Minutes  --  30  -JA     62981 - PT Manual Therapy Minutes  15  -JA  --        Exercise 1    Exercise Name 1  --  Nustep  -JA     Time 1  --  5 min  -JA     Additional Comments  --  L 5, seat 11 UE/LE  -JA        Exercise 2    Exercise Name 2  --  Reverse shoulder rolls  -     Reps 2  --  10  -JA     Additional Comments  --  1#  -JA        Exercise 3    Exercise Name 3  --  scap ret w/tband  -JA     Reps 3  --  15  -JA     Time 3  --  3 sec  -JA     Additional Comments  --  added yellow tband  -JA        Exercise 4    Exercise Name 4  --  progressed to t.a. w/bent knee fallout   -JA     Reps 4  --  15  -JA     Time 4  --  3 sec  -JA        Exercise 5    Exercise Name 5  --  Partial bridge w/PPT w/t.a.  -JA     Reps 5  --  5  -JA     Time 5  --  3 sec  -JA     Additional Comments  --  progressed; cued to perform tilt w/ta and then use leg musccles to lift; cued lifting toes to reduce hamstring potential spasm  -JA        Exercise 6    Exercise Name 6  --  standing chin tuck  -JA     Reps 6  --  10  -JA     Time 6  --  3 sec  -JA     Additional Comments  --  at mirror  -JA        Exercise 7    Exercise Name 7  --  LTR  -JA     Reps 7  --  5  -JA     Time 7  --   5 sec  -JA        Exercise 8    Exercise Name 8  --  SKC  -JA     Reps 8  --  3  -JA     Time 8  --  15 sec  -JA        Exercise 9    Exercise Name 9  --  pre-stretch position for piriformis  -JA     Reps 9  --  2  -JA     Time 9  --  30 sec  -JA     Additional Comments  --  hooklying, figure 4  -JA        Exercise 10    Exercise Name 10  --  supine judi shld flexion spine elongation stretch  -JA     Reps 10  --  5  -JA     Time 10  --  10 sec  -JA       User Key  (r) = Recorded By, (t) = Taken By, (c) = Cosigned By    Initials Name Provider Type    Jeni Connor, PT Physical Therapist                        Manual Rx (last 36 hours)      Manual Treatments     Row Name 02/07/19 0900             Total Minutes    64517 - PT Manual Therapy Minutes  15  -JA         Manual Rx 1    Manual Rx 1 Location  prone position for LB, thor, and neck  -SETH      Manual Rx 1 Type  STM, myofascial mobilization, gentle PA's; also posterior cerv pvm  -SETH      Manual Rx 1 Duration  15  -JA        User Key  (r) = Recorded By, (t) = Taken By, (c) = Cosigned By    Initials Name Provider Type    Jeni Connor, PT Physical Therapist          PT OP Goals     Row Name 02/07/19 0800          PT Short Term Goals    STG Date to Achieve  02/06/19  -SETH     STG 1  Patient will be independent and compliant with initial HEP   -SETH     STG 1 Progress  Met  -SETH     STG 2  Pt will demonstrate correct posture in sitting and standing.  -SETH     STG 2 Progress  Met  -SETH     STG 2 Progress Comments  demonstrates good posture in sitting and standing.  -SETH     STG 3  Pt will be able to move through 75% of full cervical AROM without increased pain.   -SETH     STG 3 Progress  Progressing;Partially Met  -SETH     STG 3 Progress Comments  ROM judi rot 75%, reports pulling but not pain, Pain on R with R sidebending   -SETH        Long Term Goals    LTG Date to Achieve  02/23/19  -SETH     LTG 1  Pt will be independent with advanced HEP for spinal stabilization  and UE/LE/core strengthening.  -JA     LTG 1 Progress  Ongoing;Progressing  -SETH     LTG 2  Pt will demonstrate correct body mechanics with ADL's and work activity to decrease strain on spine.  -SETH     LTG 2 Progress  Ongoing;Progressing  -SETH     LTG 2 Progress Comments  educated for standing posture with housework and painting hooby  -JA     LTG 3  Pt will be able to move through 50-75% of full trunk ROM without increased pain   -SETH     LTG 3 Progress  Progressing  -SETH     LTG 3 Progress Comments  ache in mid-lumbar with extension and R sidebending  -JA     LTG 4  Pt will score 44% on Oswestry Disability Index indicating decrease in perceived functional disability.   -SETH     LTG 4 Progress  New  -SETH     LTG 5  Pt will score 34% or less on NDI indicating decrease in perceived functional disability.   -STEH     LTG 5 Progress  New  -SETH       User Key  (r) = Recorded By, (t) = Taken By, (c) = Cosigned By    Initials Name Provider Type    Jeni Connor, PT Physical Therapist          Therapy Education  Education Details: progressed strengthening/stabilization               Time Calculation:   Start Time: 0830  Stop Time: 0930  Time Calculation (min): 60 min  Therapy Suggested Charges     Code   Minutes Charges    40415 (CPT®) Hc Pt Neuromusc Re Education Ea 15 Min      74764 (CPT®) Hc Pt Ther Proc Ea 15 Min 30 2    43715 (CPT®) Hc Gait Training Ea 15 Min      69317 (CPT®) Hc Pt Therapeutic Act Ea 15 Min      11352 (CPT®) Hc Pt Manual Therapy Ea 15 Min 15 1    48290 (CPT®) Hc Pt Ther Massage- Per 15 Min      40823 (CPT®) Hc Pt Iontophoresis Ea 15 Min      01838 (CPT®) Hc Pt Elec Stim Ea-Per 15 Min      47350 (CPT®) Hc Pt Ultrasound Ea 15 Min      73540 (CPT®) Hc Pt Self Care/Mgmt/Train Ea 15 Min      71835 (CPT®) Hc Pt Prosthetic (S) Train Initial Encounter, Each 15 Min      96842 (CPT®) Hc Orthotic(S) Mgmt/Train Initial Encounter, Each 15min      77811 (CPT®) Hc Pt Aquatic Therapy Ea 15 Min      15435 (CPT®) Hc  Pt Orthotic(S)/Prosthetic(S) Encounter, Each 15 Min       (CPT®) Hc Pt Electrical Stim Unattended 15 1    Total  60 4        Therapy Charges for Today     Code Description Service Date Service Provider Modifiers Qty    64527557352 HC PT THER PROC EA 15 MIN 2/7/2019 Jeni French, PT GP 2    69578815581 HC PT MANUAL THERAPY EA 15 MIN 2/7/2019 Jeni French, PT GP 1    66336034498 HC PT ELECTRICAL STIM UNATTENDED 2/7/2019 Jeni French, PT  1    00078471601 HC PT HOT OR COLD PACK TREAT MCARE 2/7/2019 Jeni French, PT GP 1                    Jeni ELDER Amend, PT  2/7/2019

## 2019-02-11 ENCOUNTER — HOSPITAL ENCOUNTER (OUTPATIENT)
Dept: PHYSICAL THERAPY | Facility: HOSPITAL | Age: 59
Setting detail: THERAPIES SERIES
Discharge: HOME | End: 2019-02-11
Attending: INTERNAL MEDICINE

## 2019-02-11 DIAGNOSIS — M54.2 NECK PAIN: ICD-10-CM

## 2019-02-11 DIAGNOSIS — R29.898 WEAKNESS OF BOTH LOWER EXTREMITIES: ICD-10-CM

## 2019-02-11 DIAGNOSIS — R29.3 ABNORMAL POSTURE: ICD-10-CM

## 2019-02-11 DIAGNOSIS — M54.50 LUMBAR PAIN: Primary | ICD-10-CM

## 2019-02-11 PROCEDURE — 97140 MANUAL THERAPY 1/> REGIONS: CPT

## 2019-02-11 PROCEDURE — 97110 THERAPEUTIC EXERCISES: CPT

## 2019-02-11 PROCEDURE — G0283 ELEC STIM OTHER THAN WOUND: HCPCS

## 2019-02-11 NOTE — THERAPY TREATMENT NOTE
Outpatient Physical Therapy Ortho Treatment Note  [facility]     Patient Name: Cayla Lopez  : 1960  MRN: [id number]  Today's Date: 2019      Visit Date: 2019    Visit Dx:    ICD-10-CM ICD-9-CM   1. Lumbar pain M54.5 724.2   2. Neck pain M54.2 723.1   3. Weakness of both lower extremities R29.898 729.89   4. Abnormal posture R29.3 781.92       Patient Active Problem List   Diagnosis   • Cervical radiculopathy   • Edema   • Ulnar nerve entrapment   • Fibromyalgia   • Insomnia   • Multiple sclerosis (CMS/HCC)   • Varicose veins of lower extremities   • Thumb tendonitis   • Osteoarthritis of right thumb   • Restless leg syndrome   • Primary osteoarthritis involving multiple joints   • Meningioma (CMS/HCC)   • Contusion of right knee   • Arthritis of right knee        Past Medical History:   Diagnosis Date   • MS (multiple sclerosis) (CMS/HCC)         No past surgical history on file.                    PT Assessment/Plan     Row Name 19 0800          PT Assessment    Assessment Comments  Pt  reported increased thoracic pain upon lying down on mat table today.  PA's grade I-II irritated pain, point tenderness noted judi thor pvm.  She reports being more cognizant of posture and body mechanics but tends to catch herself after she has already been there for a bit.  -JA        PT Plan    PT Plan Comments  add towel rotation stretch, assess response to previous visit  -SETH       User Key  (r) = Recorded By, (t) = Taken By, (c) = Cosigned By    Initials Name Provider Type    Jeni Connor, PT Physical Therapist          Modalities     Row Name 19 0700             Subjective Pain    Post-Treatment Pain Level  4  -JA         Moist Heat    MH Applied  Yes  -JA      Location  LB and neck (w/estim), supine with legs over traction stool  -SETH      Rx Minutes  15 mins  -JA         ELECTRICAL STIMULATION    Attended/Unattended  Unattended  -SETH      Stimulation Type  IFC  -SETH      Max mAmp   8  -JA      Location/Electrode Placement/Other  judi lower thoracic pvm  -JA       PT E-Stim Unattended (Manual) Minutes  15  -JA        User Key  (r) = Recorded By, (t) = Taken By, (c) = Cosigned By    Initials Name Provider Type    Jeni Connor, PT Physical Therapist          Exercises     Row Name 02/11/19 0700             Subjective Comments    Subjective Comments  Hurting a little more this morning, I think the rain bothers it.  -JA         Subjective Pain    Able to rate subjective pain?  yes  -JA      Pre-Treatment Pain Level  4  -JA      Post-Treatment Pain Level  4  -JA         Total Minutes    55036 - PT Therapeutic Exercise Minutes  30  -JA      15251 - PT Manual Therapy Minutes  15  -JA         Exercise 1    Exercise Name 1  Nustep  -JA      Time 1  5 min  -JA      Additional Comments  L 5, seat 11 UE/LE  -JA         Exercise 2    Exercise Name 2  Reverse shoulder rolls  -JA      Reps 2  15  -JA      Additional Comments  3#  -JA         Exercise 3    Exercise Name 3  scap ret w/tband  -JA      Reps 3  15  -JA      Time 3  3 sec  -JA      Additional Comments  Yellow  -JA         Exercise 4    Exercise Name 4  progressed to t.a. w/bent knee fallout   -JA      Reps 4  15  -JA      Time 4  3 sec  -JA         Exercise 5    Exercise Name 5  Partial bridge w/PPT w/t.a.  -JA      Reps 5  --  -JA      Time 5  --  -JA      Additional Comments  resume if ready  -JA         Exercise 6    Exercise Name 6  standing chin tuck  -JA      Reps 6  10  -JA      Time 6  3 sec  -JA         Exercise 7    Exercise Name 7  LTR  -JA      Reps 7  5  -JA      Time 7  5 sec  -JA         Exercise 8    Exercise Name 8  SKC  -JA      Reps 8  3  -JA      Time 8  15 sec  -JA         Exercise 9    Exercise Name 9  pre-stretch position for piriformis  -JA      Reps 9  2  -JA      Time 9  30 sec  -JA         Exercise 10    Exercise Name 10  supine judi shld flexion spine elongation stretch  -JA      Reps 10  5  -JA      Time 10   10 sec  -JA         Exercise 11    Exercise Name 11  scap ret in standing and supine  -JA      Reps 11  5  -JA      Time 11  5sec  -JA         Exercise 12    Exercise Name 12  SL thor rotation  -JA      Reps 12  5  -JA         Exercise 13    Exercise Name 13  attempted supine over foam pool noodle  -JA      Time 13  8-10 sec  -JA      Additional Comments  did not tolerate well  -JA        User Key  (r) = Recorded By, (t) = Taken By, (c) = Cosigned By    Initials Name Provider Type    Jeni Connor, PT Physical Therapist                        Manual Rx (last 36 hours)      Manual Treatments     Row Name 02/11/19 0700             Total Minutes    57507 - PT Manual Therapy Minutes  15  -JA         Manual Rx 1    Manual Rx 1 Location  prone position for LB, thor, and neck  -SETH      Manual Rx 1 Type  STM, myofascial mobilization, gentle PA's; also posterior cerv pvm STM (noted pain in mid-low thor centrally)  -SETH      Manual Rx 1 Duration  15  -JA        User Key  (r) = Recorded By, (t) = Taken By, (c) = Cosigned By    Initials Name Provider Type    Jeni Connor, PT Physical Therapist              Therapy Education  Education Details: Worked on posture without locked knees, pt notes increasing awareness.  Also discussed avoiding or breaking up stnding to pain or at counter for long period to avoid increasing stress.  She did not use a stool to prop foot when painting.  Given: Symptoms/condition management, Pain management, Posture/body mechanics  Program: Reinforced  How Provided: Demonstration  Provided to: Patient  Level of Understanding: Teach back education performed              Time Calculation:   Start Time: 0745  Stop Time: 0845  Time Calculation (min): 60 min  Therapy Suggested Charges     Code   Minutes Charges    29870 (CPT®) Hc Pt Neuromusc Re Education Ea 15 Min      85824 (CPT®) Hc Pt Ther Proc Ea 15 Min 30 2    23417 (CPT®) Hc Gait Training Ea 15 Min      27531 (CPT®) Hc Pt Therapeutic  Act Ea 15 Min      36439 (CPT®) Hc Pt Manual Therapy Ea 15 Min 15 1    81461 (CPT®) Hc Pt Ther Massage- Per 15 Min      64716 (CPT®) Hc Pt Iontophoresis Ea 15 Min      91641 (CPT®) Hc Pt Elec Stim Ea-Per 15 Min      65543 (CPT®) Hc Pt Ultrasound Ea 15 Min      82249 (CPT®) Hc Pt Self Care/Mgmt/Train Ea 15 Min      11518 (CPT®) Hc Pt Prosthetic (S) Train Initial Encounter, Each 15 Min      41643 (CPT®) Hc Orthotic(S) Mgmt/Train Initial Encounter, Each 15min      86703 (CPT®) Hc Pt Aquatic Therapy Ea 15 Min      61816 (CPT®) Hc Pt Orthotic(S)/Prosthetic(S) Encounter, Each 15 Min       (CPT®) Hc Pt Electrical Stim Unattended 15 1    Total  60 4        Therapy Charges for Today     Code Description Service Date Service Provider Modifiers Qty    22100486473 HC PT THER PROC EA 15 MIN 2/11/2019 Jeni French, PT GP 2    57941968516 HC PT MANUAL THERAPY EA 15 MIN 2/11/2019 Jeni French, PT GP 1    21308938877 HC PT ELECTRICAL STIM UNATTENDED 2/11/2019 Jeni French, PT  1    09276773636 HC PT HOT OR COLD PACK TREAT MCARE 2/11/2019 Jeni French, PT GP 1                    Jeni French, PT  2/11/2019

## 2019-02-14 ENCOUNTER — APPOINTMENT (OUTPATIENT)
Dept: PHYSICAL THERAPY | Facility: HOSPITAL | Age: 59
End: 2019-02-14
Attending: INTERNAL MEDICINE

## 2019-02-18 ENCOUNTER — HOSPITAL ENCOUNTER (OUTPATIENT)
Dept: PHYSICAL THERAPY | Facility: HOSPITAL | Age: 59
Setting detail: THERAPIES SERIES
Discharge: HOME | End: 2019-02-18
Attending: INTERNAL MEDICINE

## 2019-02-18 DIAGNOSIS — M54.50 LUMBAR PAIN: Primary | ICD-10-CM

## 2019-02-18 DIAGNOSIS — R29.3 ABNORMAL POSTURE: ICD-10-CM

## 2019-02-18 DIAGNOSIS — R29.898 WEAKNESS OF BOTH LOWER EXTREMITIES: ICD-10-CM

## 2019-02-18 DIAGNOSIS — M54.2 NECK PAIN: ICD-10-CM

## 2019-02-18 PROCEDURE — G0283 ELEC STIM OTHER THAN WOUND: HCPCS

## 2019-02-18 PROCEDURE — 97110 THERAPEUTIC EXERCISES: CPT

## 2019-02-18 PROCEDURE — 97140 MANUAL THERAPY 1/> REGIONS: CPT

## 2019-02-18 NOTE — THERAPY TREATMENT NOTE
"    Outpatient Physical Therapy Ortho Treatment Note  [facility]     Patient Name: Cayla Lopez  : 1960  MRN: [id number]  Today's Date: 2019      Visit Date: 2019    Visit Dx:    ICD-10-CM ICD-9-CM   1. Lumbar pain M54.5 724.2   2. Neck pain M54.2 723.1   3. Weakness of both lower extremities R29.898 729.89   4. Abnormal posture R29.3 781.92       Patient Active Problem List   Diagnosis   • Cervical radiculopathy   • Edema   • Ulnar nerve entrapment   • Fibromyalgia   • Insomnia   • Multiple sclerosis (CMS/HCC)   • Varicose veins of lower extremities   • Thumb tendonitis   • Osteoarthritis of right thumb   • Restless leg syndrome   • Primary osteoarthritis involving multiple joints   • Meningioma (CMS/HCC)   • Contusion of right knee   • Arthritis of right knee        Past Medical History:   Diagnosis Date   • MS (multiple sclerosis) (CMS/HCC)         No past surgical history on file.                    PT Assessment/Plan     Row Name 19 0900          PT Assessment    Assessment Comments  Ms. Lopez reports increased pain today in \"all joints\" but especially in judi distal lev/UB and R PSIS.  Modified today's ther ex for gentle elongation stretching and spinal strengthening.  We discussed possibility of adding US next visit if pain persists.  Pt noted pain in R PSIS was reduced with PPT.  Recommended lumbar roll and went over posture and positioning to reduce strain.  -SETH        PT Plan    PT Plan Comments  assess response to new ex's  -SETH       User Key  (r) = Recorded By, (t) = Taken By, (c) = Cosigned By    Initials Name Provider Type    Jeni Connor, PT Physical Therapist          Modalities     Row Name 19 0800             Subjective Comments    Subjective Comments  I woke up and had pain everywhere. All my joints were hurting, and especially R (PSIS) low back, across my neck and shoulders.  -SETH         Subjective Pain    Able to rate subjective pain?  yes  -SETH      " Pre-Treatment Pain Level  8  -JA      Post-Treatment Pain Level  7  -JA         Moist Heat    MH Applied  Yes  -JA      Location  LB and neck (w/estim), supine with legs over traction stool  -JA      Rx Minutes  15 mins  -JA         ELECTRICAL STIMULATION    Attended/Unattended  Unattended  -JA      Stimulation Type  IFC;Pre-Mod  -JA      Max mAmp  10 channel 1) 10.5 UB near levator insertion 2) R PSIS  -JA      Location/Electrode Placement/Other  channel 1) judi levator insertion;  channel 2) R PSIS  -JA       PT E-Stim Unattended (Manual) Minutes  15  -JA        User Key  (r) = Recorded By, (t) = Taken By, (c) = Cosigned By    Initials Name Provider Type    Jeni Connor, PT Physical Therapist          Exercises     Row Name 02/18/19 0900 02/18/19 0800          Subjective Comments    Subjective Comments  --  I woke up and had pain everywhere. All my joints were hurting, and especially R (PSIS) low back, across my neck and shoulders.  -JA        Subjective Pain    Able to rate subjective pain?  --  yes  -JA     Pre-Treatment Pain Level  --  8  -JA     Post-Treatment Pain Level  --  7  -JA        Total Minutes    14321 - PT Therapeutic Exercise Minutes  --  15  -JA     57659 - PT Manual Therapy Minutes  15  -JA  --        Exercise 1    Exercise Name 1  Nustep  -SETH  --     Time 1  5 min  -JA  --     Additional Comments  L 5, seat 11 UE/LE  -SETH  --        Exercise 2    Exercise Name 2  Reverse shoulder rolls  -SETH  --     Reps 2  15  -JA  --        Exercise 3    Exercise Name 3  scap ret w/tband  -SETH  --     Reps 3  15  -JA  --     Time 3  3 sec  -JA  --     Additional Comments  yellow  -SETH  --        Exercise 4    Exercise Name 4  progressed to t.a. w/bent knee fallout   -SETH  --     Reps 4  --  -JA  --     Time 4  --  -JA  --     Additional Comments  resume next visit  -JA  --        Exercise 5    Exercise Name 5  Partial bridge w/PPT w/t.a.  -SETH  --     Additional Comments  resume next visit  -  --         Exercise 6    Exercise Name 6  standing chin tuck  -JA  --        Exercise 7    Exercise Name 7  LTR  -JA  --     Reps 7  5  -JA  --     Time 7  5 sec  -JA  --        Exercise 8    Exercise Name 8  SKC  -JA  --     Reps 8  3  -JA  --     Time 8  15 sec  -JA  --        Exercise 9    Exercise Name 9  pre-stretch position for piriformis  -JA  --     Additional Comments  resume next visit if tolerated  -JA  --        Exercise 10    Exercise Name 10  supine judi shld flexion spine elongation stretch  -JA  --        Exercise 11    Exercise Name 11  scap ret in standing and supine  -JA  --        Exercise 12    Exercise Name 12  SL thor rotation  -JA  --        Exercise 13    Exercise Name 13  --  -JA  --        Exercise 14    Exercise Name 14  prayer stretch  -JA  --     Reps 14  3  -JA  --     Time 14  5sec  -JA  --        Exercise 15    Exercise Name 15  bl shld stretch/elongation at wall  -JA  --     Reps 15  5  -JA  --     Time 15  5 sec  -JA  --       User Key  (r) = Recorded By, (t) = Taken By, (c) = Cosigned By    Initials Name Provider Type    Jeni Connor, PT Physical Therapist                        Manual Rx (last 36 hours)      Manual Treatments     Row Name 02/18/19 0900             Total Minutes    23538 - PT Manual Therapy Minutes  15  -JA         Manual Rx 1    Manual Rx 1 Location  supine; C-spine/ upper thor; SL for R lumbar/PSIS  -JA      Manual Rx 1 Type  STM neck and upper back kelly levator, cerv distraction; L SL for R LB MFM and STM  -JA      Manual Rx 1 Duration  15  -JA        User Key  (r) = Recorded By, (t) = Taken By, (c) = Cosigned By    Initials Name Provider Type    Jeni Connor, PT Physical Therapist              Therapy Education  Education Details: Continues to require cuing for posture in standing.  Recommended packing activity to avoid prolonged positioning.  Given: Symptoms/condition management, Pain management, Posture/body mechanics, HEP  Program: Progressed,  Modified  How Provided: Verbal, Demonstration, Written  Provided to: Patient  Level of Understanding: Teach back education performed              Time Calculation:   Start Time: 0830  Stop Time: 0920  Time Calculation (min): 50 min  Therapy Suggested Charges     Code   Minutes Charges    15047 (CPT®) Hc Pt Neuromusc Re Education Ea 15 Min      88011 (CPT®) Hc Pt Ther Proc Ea 15 Min 15 1    39628 (CPT®) Hc Gait Training Ea 15 Min      71826 (CPT®) Hc Pt Therapeutic Act Ea 15 Min      60465 (CPT®) Hc Pt Manual Therapy Ea 15 Min 15 1    51331 (CPT®) Hc Pt Ther Massage- Per 15 Min      01061 (CPT®) Hc Pt Iontophoresis Ea 15 Min      71118 (CPT®) Hc Pt Elec Stim Ea-Per 15 Min      18277 (CPT®) Hc Pt Ultrasound Ea 15 Min      77287 (CPT®) Hc Pt Self Care/Mgmt/Train Ea 15 Min      07175 (CPT®) Hc Pt Prosthetic (S) Train Initial Encounter, Each 15 Min      08761 (CPT®) Hc Orthotic(S) Mgmt/Train Initial Encounter, Each 15min      44066 (CPT®) Hc Pt Aquatic Therapy Ea 15 Min      16219 (CPT®) Hc Pt Orthotic(S)/Prosthetic(S) Encounter, Each 15 Min       (CPT®) Hc Pt Electrical Stim Unattended 15 1    Total  45 3        Therapy Charges for Today     Code Description Service Date Service Provider Modifiers Qty    61916228697 HC PT THER PROC EA 15 MIN 2/18/2019 Jeni French, PT GP 1    38936058449 HC PT MANUAL THERAPY EA 15 MIN 2/18/2019 Jeni French, PT GP 1    91806903997 HC PT ELECTRICAL STIM UNATTENDED 2/18/2019 Jeni French, PT  1    23023088948 HC PT HOT OR COLD PACK TREAT MCARE 2/18/2019 Jeni French, PT GP 1                    Jeni French, PT  2/18/2019

## 2019-02-21 ENCOUNTER — HOSPITAL ENCOUNTER (OUTPATIENT)
Dept: PHYSICAL THERAPY | Facility: HOSPITAL | Age: 59
Setting detail: THERAPIES SERIES
Discharge: HOME | End: 2019-02-21
Attending: INTERNAL MEDICINE

## 2019-02-21 DIAGNOSIS — R29.898 WEAKNESS OF BOTH LOWER EXTREMITIES: ICD-10-CM

## 2019-02-21 DIAGNOSIS — M54.2 NECK PAIN: ICD-10-CM

## 2019-02-21 DIAGNOSIS — M54.50 LUMBAR PAIN: Primary | ICD-10-CM

## 2019-02-21 DIAGNOSIS — R29.3 ABNORMAL POSTURE: ICD-10-CM

## 2019-02-21 PROCEDURE — G0283 ELEC STIM OTHER THAN WOUND: HCPCS

## 2019-02-21 PROCEDURE — 97110 THERAPEUTIC EXERCISES: CPT

## 2019-02-21 PROCEDURE — 97140 MANUAL THERAPY 1/> REGIONS: CPT

## 2019-02-21 NOTE — THERAPY PROGRESS REPORT/RE-CERT
Outpatient Physical Therapy Ortho Progress Note  [facility]     Patient Name: Cayla Lopez  : 1960  MRN: [id number]  Today's Date: 2019      Visit Date: 2019    Visit Dx:    ICD-10-CM ICD-9-CM   1. Lumbar pain M54.5 724.2   2. Neck pain M54.2 723.1   3. Weakness of both lower extremities R29.898 729.89   4. Abnormal posture R29.3 781.92       Patient Active Problem List   Diagnosis   • Cervical radiculopathy   • Edema   • Ulnar nerve entrapment   • Fibromyalgia   • Insomnia   • Multiple sclerosis (CMS/HCC)   • Varicose veins of lower extremities   • Thumb tendonitis   • Osteoarthritis of right thumb   • Restless leg syndrome   • Primary osteoarthritis involving multiple joints   • Meningioma (CMS/HCC)   • Contusion of right knee   • Arthritis of right knee        Past Medical History:   Diagnosis Date   • MS (multiple sclerosis) (CMS/HCC)         No past surgical history on file.    PT Ortho     Row Name 19 0700       Myotomal Screen- Lower Quarter Clearing    Hip flexion (L2)  Right:;3+ (Fair +);Left:;4- (Good -)  -JA    Knee extension (L3)  Bilateral:;4- (Good -)  -JA    Ankle DF (L4)  Right:;3+ (Fair +);Left:;4- (Good -)  -JA    Knee flexion (S2)  Right:;3+ (Fair +);Left:;4- (Good -)  -JA      User Key  (r) = Recorded By, (t) = Taken By, (c) = Cosigned By    Initials Name Provider Type    Jeni Connor, PT Physical Therapist                      PT Assessment/Plan     Row Name 19 0700          PT Assessment    Functional Limitations  Impaired gait;Limitation in home management;Limitations in community activities;Limitations in functional capacity and performance;Performance in leisure activities;Performance in self-care ADL  -SETH     Impairments  Pain;Posture;Poor body mechanics;Range of motion;Muscle strength;Gait;Endurance  -SETH     Assessment Comments  Ms. Lopez has been seen for 7 visits for neck and low back pain.  Her current pain is 5/10 decreased  from 8/10.   She reports her sciatica symptoms have stopped however her low back pain persists, primarily on the R in PSIS region.  She demonstrates increased awareness of posture and reports being mindful throughout the day.  Oswestry score improved from 54% to 44%, Neck Disability Index improved from 54% to 44% perceived functional disability.  Her strength has not yet improved (to be expected at this point) and her cervical AROM is limited by pain with rotation.  She would benefit from continuing ksilled therapy services.  -SETH     Please refer to paper survey for additional self-reported information  Yes  -JA     Rehab Potential  Good  -JA     Patient/caregiver participated in establishment of treatment plan and goals  Yes  -JA     Patient would benefit from skilled therapy intervention  Yes  -JA        PT Plan    PT Frequency  2x/week  -SETH     Predicted Duration of Therapy Intervention (Therapy Eval)  6-8 weeks  -SETH     Planned CPT's?  PT THER PROC EA 15 MIN: 80871;PT THER ACT EA 15 MIN: 19293;PT MANUAL THERAPY EA 15 MIN: 15565;PT NEUROMUSC RE-EDUCATION EA 15 MIN: 71547;PT SELF CARE/HOME MGMT/TRAIN EA 15: 89604;PT HOT OR COLD PACK TREAT MCARE;PT ELECTRICAL STIM UNATTEND: ;PT AQUATIC THERAPY EA 15 MIN: 79486;PT ULTRASOUND EA 15 MIN: 79519;PT TRACTION CERVICAL: 48404  -     PT Plan Comments  add ankle strengthening with tband for R, assess morning pain; cont ther ex, manual, ES  -       User Key  (r) = Recorded By, (t) = Taken By, (c) = Cosigned By    Initials Name Provider Type    Jeni Connor, PT Physical Therapist          Modalities     Row Name 02/21/19 0700             Moist Heat    MH Applied  Yes  -JA      Location  LB and neck (w/estim), supine with legs over traction stool  -JA      Rx Minutes  15 mins  -JA         ELECTRICAL STIMULATION    Attended/Unattended  Unattended  -      Stimulation Type  IFC;Pre-Mod  -JA      Max mAmp  10 channel 1) 10.5 UB near levator insertion 2) R PSIS  -JA       Location/Electrode Placement/Other  channel 1) judi levator insertion;  channel 2) R PSIS  -JA       PT E-Stim Unattended (Manual) Minutes  15  -JA        User Key  (r) = Recorded By, (t) = Taken By, (c) = Cosigned By    Initials Name Provider Type    Jeni Connor, PT Physical Therapist          Exercises     Row Name 02/21/19 0700             Subjective Comments    Subjective Comments  Better than last time but still not good.  It seems to be the early morning that is the worst.  Not getting a good night's sleep either.  -JA         Subjective Pain    Able to rate subjective pain?  yes  -JA      Pre-Treatment Pain Level  5  -JA         Total Minutes    04612 - PT Therapeutic Exercise Minutes  32  -JA      91466 - PT Manual Therapy Minutes  15  -JA         Exercise 1    Exercise Name 1  Nustep  -JA      Time 1  5 min  -JA      Additional Comments  L4  -JA         Exercise 2    Exercise Name 2  Reverse shoulder rolls  -JA      Reps 2  15  -JA         Exercise 3    Exercise Name 3  scap ret w/tband  -JA      Reps 3  15  -JA      Time 3  3 sec  -JA      Additional Comments  yellow  -JA         Exercise 4    Exercise Name 4  t.a. w/bent knee fallout   -JA      Reps 4  15  -JA         Exercise 5    Exercise Name 5  Partial bridge w/PPT w/t.a.  -JA      Additional Comments  not ready, resume next visit?  -JA         Exercise 6    Exercise Name 6  supine chin tuck  -JA      Reps 6  10  -JA      Time 6  3 sec  -JA         Exercise 7    Exercise Name 7  LTR  -JA      Reps 7  5  -JA      Time 7  5 sec  -JA         Exercise 8    Exercise Name 8  SKC  -JA      Reps 8  3  -JA      Time 8  10-15 sec  -JA         Exercise 9    Exercise Name 9  pre-stretch position for piriformis  -JA      Reps 9  3  -JA      Time 9  10 sec  -JA      Additional Comments  added light push on R and light pull on L  -JA         Exercise 10    Exercise Name 10  supine judi shld flexion spine elongation stretch  -JA      Reps 10  5  -JA       Time 10  5sec  -JA         Exercise 11    Exercise Name 11  added side stepping  -JA      Reps 11  2 laps down and back  -JA         Exercise 12    Exercise Name 12  SL thor rotation  -JA         Exercise 14    Exercise Name 14  prayer stretch  -JA      Reps 14  3  -JA      Time 14  5sec  -JA         Exercise 15    Exercise Name 15  bl shld stretch/elongation at wall  -JA      Reps 15  5  -JA      Time 15  5 sec  -JA        User Key  (r) = Recorded By, (t) = Taken By, (c) = Cosigned By    Initials Name Provider Type    Jeni Connor, PT Physical Therapist                        Manual Rx (last 36 hours)      Manual Treatments     Row Name 02/21/19 0700             Total Minutes    70354 - PT Manual Therapy Minutes  15  -JA         Manual Rx 1    Manual Rx 1 Location  supine; C-spine/ upper thor; SL for R lumbar/PSIS  -SETH      Manual Rx 1 Type  STM neck and upper back kelly levator, cerv distraction; L SL for R LB MFM and STM  -SETH      Manual Rx 1 Duration  15  -JA        User Key  (r) = Recorded By, (t) = Taken By, (c) = Cosigned By    Initials Name Provider Type    Jeni Connor, PT Physical Therapist          PT OP Goals     Row Name 02/21/19 1100          PT Short Term Goals    STG Date to Achieve  02/06/19  -SETH     STG 1  Patient will be independent and compliant with initial HEP   -SETH     STG 1 Progress  Met  -SETH     STG 2  Pt will demonstrate correct posture in sitting and standing.  -SETH     STG 2 Progress  Met  -SETH     STG 3  Pt will be able to move through 75% of full cervical AROM without increased pain.   -SETH     STG 3 Progress  Progressing;Partially Met  -SETH        Long Term Goals    LTG Date to Achieve  02/23/19  -SETH     LTG 1  Pt will be independent with advanced HEP for spinal stabilization and UE/LE/core strengthening.  -SETH     LTG 1 Progress  Ongoing;Progressing  -SETH     LTG 2  Pt will demonstrate correct body mechanics with ADL's and work activity to decrease strain on spine.  -SETH      LTG 2 Progress  Progressing;Partially Met  -JA     LTG 2 Progress Comments  demonstrates increased attention to posture and body mechanics  -JA     LTG 3  Pt will be able to move through 50-75% of full trunk ROM without increased pain   -JA     LTG 3 Progress  Progressing  -JA     LTG 4  Pt will score 44% on Oswestry Disability Index indicating decrease in perceived functional disability.   -JA     LTG 4 Progress  Met  -JA     LTG 4 Progress Comments  44%, perceived functional disability has decreased however pt reports pain 5/10  -JA     LTG 5  Pt will score 34% or less on NDI indicating decrease in perceived functional disability.   -JA     LTG 5 Progress  Met  -JA     LTG 5 Progress Comments  26%, perceived functional disability has decreased however pt reports pain 5/10  -JA       User Key  (r) = Recorded By, (t) = Taken By, (c) = Cosigned By    Initials Name Provider Type    Jeni Connor, PT Physical Therapist          Therapy Education  Education Details: added sidestepping and recommended to do sometimes in place of walking on breaks at work  Given: Symptoms/condition management, Pain management, Posture/body mechanics, HEP  Program: Progressed  How Provided: Verbal, Demonstration  Provided to: Patient  Level of Understanding: Teach back education performed    Outcome Measure Options: Neck Disability Index (NDI), Modtawnya Mitchellestry  Modified Oswestry  Modified Oswestry Score/Comments: 44%  Neck Disability Index  Section 1 - Pain Intensity: The pain is moderate at the moment.  Section 2 - Personal Care: I can look after myself normally without causing extra pain.  Section 3 - Lifting: Pain prevents me from lifting heavy weights, but I can manage light weights if they are conveniently positioned.  Section 4 - Work: I can do as much work as I want.  Section 5 - Headaches: I have slight headaches that come infrequently.  Section 6 - Concentration: I can concentrate fully without difficulty.  Section 7  - Sleeping: My sleep is moderately disturbed for up to 2-3 hours.  Section 8 - Driving: I can drive as long as I want with slight neck pain.  Section 9 - Reading: I can read as much as I want with moderate neck pain.  Section 10 - Recreation: I have some neck pain with all recreational activities.  Neck Disability Index Score: 13  Neck Disability Index Comments: 26%      Time Calculation:   Start Time: 0745  Stop Time: 0850  Time Calculation (min): 65 min  Therapy Suggested Charges     Code   Minutes Charges    44669 (CPT®) Hc Pt Neuromusc Re Education Ea 15 Min      08856 (CPT®) Hc Pt Ther Proc Ea 15 Min 32 2    43796 (CPT®) Hc Gait Training Ea 15 Min      90898 (CPT®) Hc Pt Therapeutic Act Ea 15 Min      81664 (CPT®) Hc Pt Manual Therapy Ea 15 Min 15 1    39552 (CPT®) Hc Pt Ther Massage- Per 15 Min      49015 (CPT®) Hc Pt Iontophoresis Ea 15 Min      81054 (CPT®) Hc Pt Elec Stim Ea-Per 15 Min      36680 (CPT®) Hc Pt Ultrasound Ea 15 Min      44102 (CPT®) Hc Pt Self Care/Mgmt/Train Ea 15 Min      80050 (CPT®) Hc Pt Prosthetic (S) Train Initial Encounter, Each 15 Min      91747 (CPT®) Hc Orthotic(S) Mgmt/Train Initial Encounter, Each 15min      28477 (CPT®) Hc Pt Aquatic Therapy Ea 15 Min      60062 (CPT®) Hc Pt Orthotic(S)/Prosthetic(S) Encounter, Each 15 Min       (CPT®) Hc Pt Electrical Stim Unattended 15 1    Total  62 4        Therapy Charges for Today     Code Description Service Date Service Provider Modifiers Qty    90932840759 HC PT THER PROC EA 15 MIN 2/21/2019 AmendJeni, PT GP 2    62226517884 HC PT MANUAL THERAPY EA 15 MIN 2/21/2019 Jeni French, PT GP 1    16334316634 HC PT ELECTRICAL STIM UNATTENDED 2/21/2019 Jeni French, PT  1    23714788566 HC PT HOT OR COLD PACK TREAT MCARE 2/21/2019 Jeni French, PT GP 1          PT G-Codes  Outcome Measure Options: Neck Disability Index (NDI), Modtawnya Gunn  Modified Oswestry Score/Comments: 44%  Neck Disability Index Score:  13         Jeni French, PT  2/21/2019

## 2019-02-25 ENCOUNTER — APPOINTMENT (OUTPATIENT)
Dept: PHYSICAL THERAPY | Facility: HOSPITAL | Age: 59
End: 2019-02-25
Attending: INTERNAL MEDICINE

## 2019-02-28 ENCOUNTER — APPOINTMENT (OUTPATIENT)
Dept: PHYSICAL THERAPY | Facility: HOSPITAL | Age: 59
End: 2019-02-28
Attending: INTERNAL MEDICINE

## 2019-02-28 ENCOUNTER — TELEPHONE (OUTPATIENT)
Dept: PHYSICAL THERAPY | Facility: HOSPITAL | Age: 59
End: 2019-02-28

## 2019-02-28 NOTE — TELEPHONE ENCOUNTER
Called pt regarding today's missed visit at 1:15pm.  Left message that her next appt is 3/4 at 7am and to please call if she can't make it.

## 2019-03-04 ENCOUNTER — TELEPHONE (OUTPATIENT)
Dept: PHYSICAL THERAPY | Facility: HOSPITAL | Age: 59
End: 2019-03-04

## 2019-03-04 ENCOUNTER — APPOINTMENT (OUTPATIENT)
Dept: PHYSICAL THERAPY | Facility: HOSPITAL | Age: 59
End: 2019-03-04

## 2019-03-04 RX ORDER — PRAMIPEXOLE DIHYDROCHLORIDE 1.5 MG/1
1.5 TABLET ORAL NIGHTLY
Qty: 30 TABLET | Refills: 3 | Status: SHIPPED | OUTPATIENT
Start: 2019-03-04 | End: 2019-07-01 | Stop reason: SDUPTHER

## 2019-03-04 NOTE — TELEPHONE ENCOUNTER
Left voicemail regarding today's missed visit.  Reminded of next visit this Thursday at 7 a.m. and asked that she please confirm this appt by Tuesday evening or I will have to cancel it; left our #[phone].

## 2019-03-06 ENCOUNTER — TELEPHONE (OUTPATIENT)
Dept: PHYSICAL THERAPY | Facility: HOSPITAL | Age: 59
End: 2019-03-06

## 2019-03-07 ENCOUNTER — HOSPITAL ENCOUNTER (OUTPATIENT)
Dept: PHYSICAL THERAPY | Facility: HOSPITAL | Age: 59
Setting detail: THERAPIES SERIES
Discharge: HOME | End: 2019-03-07

## 2019-03-07 DIAGNOSIS — R29.898 WEAKNESS OF BOTH LOWER EXTREMITIES: ICD-10-CM

## 2019-03-07 DIAGNOSIS — M54.50 LUMBAR PAIN: Primary | ICD-10-CM

## 2019-03-07 DIAGNOSIS — M54.2 NECK PAIN: ICD-10-CM

## 2019-03-07 DIAGNOSIS — R29.3 ABNORMAL POSTURE: ICD-10-CM

## 2019-03-07 PROCEDURE — 97110 THERAPEUTIC EXERCISES: CPT

## 2019-03-07 PROCEDURE — 97140 MANUAL THERAPY 1/> REGIONS: CPT

## 2019-03-07 NOTE — THERAPY TREATMENT NOTE
Outpatient Physical Therapy Ortho Treatment Note  [facility]     Patient Name: Cayla Lopez  : 1960  MRN: [id number]  Today's Date: 3/7/2019      Visit Date: 2019    Visit Dx:    ICD-10-CM ICD-9-CM   1. Lumbar pain M54.5 724.2   2. Neck pain M54.2 723.1   3. Weakness of both lower extremities R29.898 729.89   4. Abnormal posture R29.3 781.92       Patient Active Problem List   Diagnosis   • Cervical radiculopathy   • Edema   • Ulnar nerve entrapment   • Fibromyalgia   • Insomnia   • Multiple sclerosis (CMS/HCC)   • Varicose veins of lower extremities   • Thumb tendonitis   • Osteoarthritis of right thumb   • Restless leg syndrome   • Primary osteoarthritis involving multiple joints   • Meningioma (CMS/HCC)   • Contusion of right knee   • Arthritis of right knee        Past Medical History:   Diagnosis Date   • MS (multiple sclerosis) (CMS/HCC)         No past surgical history on file.          19 0800   PT Assessment   Assessment Comments Pt returns to therapy reporting some improvement but c/o persisting back pain that is worst in the morning.  We discussed her ergonomics at work/work station and determined her chair isn't good because it does not adjust up to height she needs.  Also worked on startegies to reduce strain at work, goal is for movement every 30 minutes to avoid stresses building on her spine.   PT Plan   PT Plan Comments assess whether she changed position or did ex's to break up position at work q 30 mins                         Exercises     Row Name 19 0700             Subjective Comments    Subjective Comments  I'm sorry I missed two appts.  There was a lot of stuff going on.  Today is not too bad becasue I have been up a while and moving around becuase I didn't sleep well.  Yesterday was bad, I don't know if it's because I walked around at the home garden show recently.    -JA         Subjective Pain    Able to rate subjective pain?  yes  -SETH      Pre-Treatment  Pain Level  4  -         Total Minutes    10487 - PT Therapeutic Exercise Minutes  30  -JA      52024 - PT Manual Therapy Minutes  12  -JA         Exercise 1    Exercise Name 1  Nustep  -      Time 1  5 min  -JA      Additional Comments  L5  -JA         Exercise 2    Exercise Name 2  Reverse shoulder rolls  -JA      Reps 2  5  -JA         Exercise 3    Exercise Name 3  scap ret w/tband  -JA      Reps 3  15  -JA      Additional Comments  red  -JA         Exercise 4    Exercise Name 4  Standing chin tuck  -JA      Reps 4  5  -JA         Exercise 5    Exercise Name 5  scap ret no resistance  -      Reps 5  5  -JA         Exercise 6    Exercise Name 6  Worked on ergonomics of computer desk including seat height, keyboard tray, mouse placement, use of standing desk, suggested adding small step stool to prop foot while at standing desk, use of laptop.    -      Time 6  15 min  -        User Key  (r) = Recorded By, (t) = Taken By, (c) = Cosigned By    Initials Name Provider Type    Jeni Connor, PT Physical Therapist                        Manual Rx (last 36 hours)      Manual Treatments     Row Name 03/07/19 0700             Total Minutes    09744 - PT Manual Therapy Minutes  12  -JA         Manual Rx 1    Manual Rx 1 Location  prone position, mid and LB  -      Manual Rx 1 Type  foam rolling, STM, gross mobs grade I-II  -      Manual Rx 1 Duration  12  -        User Key  (r) = Recorded By, (t) = Taken By, (c) = Cosigned By    Initials Name Provider Type    Jeni Connor, PT Physical Therapist          PT OP Goals     Row Name 03/07/19 0700          PT Short Term Goals    STG Date to Achieve  02/06/19  -SETH     STG 1  Patient will be independent and compliant with initial HEP   -JA     STG 1 Progress  Met  -     STG 2  Pt will demonstrate correct posture in sitting and standing.  -JA     STG 2 Progress  Met  -JA     STG 3  Pt will be able to move through 75% of full cervical AROM  "without increased pain.   -SETH     STG 3 Progress  Progressing;Partially Met  -SETH     STG 3 Progress Comments  75% rotation but with pain  -SETH        Long Term Goals    LTG Date to Achieve  02/23/19  -SETH     LTG 1  Pt will be independent with advanced HEP for spinal stabilization and UE/LE/core strengthening.  -JA     LTG 1 Progress  Ongoing;Progressing  -SETH     LTG 2  Pt will demonstrate correct body mechanics with ADL's and work activity to decrease strain on spine.  -JA     LTG 2 Progress  Progressing;Partially Met  -SETH     LTG 3  Pt will be able to move through 50-75% of full trunk ROM without increased pain   -JA     LTG 3 Progress  Progressing  -SETH     LTG 4  Pt will score 44% on Oswestry Disability Index indicating decrease in perceived functional disability.   -SETH     LTG 4 Progress  Met  -SETH     LTG 5  Pt will score 34% or less on NDI indicating decrease in perceived functional disability.   -SETH     LTG 5 Progress  Met  -SETH       User Key  (r) = Recorded By, (t) = Taken By, (c) = Cosigned By    Initials Name Provider Type    Jeni Connor, PT Physical Therapist          Therapy Education  Education Details: Discussed importance of pulling together all she has learned in therapy (stretching, strengthening, stabilization, posture and body mechanics) and applying to her work life.  She must change the placement of her keyboard (no keyboard tray currently but has pull out pencil drawer that may work or she can have the tray re-attached).  Discussed importance of getting some cardio as well.  Pt's assignment is to stop every 30 minutes at work and do 3 reverse shld rolls, 3 chin tucks, and 3 scap squeezes at the least and every hour stand up and if possible walk around.  Goal is to walk twice a day at work for 5 continuous minutes, swinging arms to promote relaxation of shoulders and neck.  Intermittently use standing desk, preferably before she reaches \"severe\" pain and has to stand.  Given: " Symptoms/condition management, Posture/body mechanics, Pain management  Program: Progressed  How Provided: Verbal, Demonstration  Provided to: Patient  Level of Understanding: Teach back education performed, Verbalized              Time Calculation:   Start Time: 0700  Stop Time: 0745  Time Calculation (min): 45 min  Therapy Suggested Charges     Code   Minutes Charges    49273 (CPT®) Hc Pt Neuromusc Re Education Ea 15 Min      70171 (CPT®) Hc Pt Ther Proc Ea 15 Min 30 2    36758 (CPT®) Hc Gait Training Ea 15 Min      02715 (CPT®) Hc Pt Therapeutic Act Ea 15 Min      95974 (CPT®) Hc Pt Manual Therapy Ea 15 Min 12 1    12925 (CPT®) Hc Pt Ther Massage- Per 15 Min      84901 (CPT®) Hc Pt Iontophoresis Ea 15 Min      24578 (CPT®) Hc Pt Elec Stim Ea-Per 15 Min      89403 (CPT®) Hc Pt Ultrasound Ea 15 Min      19167 (CPT®) Hc Pt Self Care/Mgmt/Train Ea 15 Min      49032 (CPT®) Hc Pt Prosthetic (S) Train Initial Encounter, Each 15 Min      06418 (CPT®) Hc Orthotic(S) Mgmt/Train Initial Encounter, Each 15min      05772 (CPT®) Hc Pt Aquatic Therapy Ea 15 Min      54589 (CPT®) Hc Pt Orthotic(S)/Prosthetic(S) Encounter, Each 15 Min       (CPT®) Hc Pt Electrical Stim Unattended      Total  42 3        Therapy Charges for Today     Code Description Service Date Service Provider Modifiers Qty    66570422910 HC PT THER PROC EA 15 MIN 3/7/2019 Jeni French, PT GP 2    10474355129 HC PT MANUAL THERAPY EA 15 MIN 3/7/2019 Jeni French PT GP 1                    Jeni French PT  3/7/2019

## 2019-03-11 ENCOUNTER — HOSPITAL ENCOUNTER (OUTPATIENT)
Dept: PHYSICAL THERAPY | Facility: HOSPITAL | Age: 59
Setting detail: THERAPIES SERIES
Discharge: HOME | End: 2019-03-11

## 2019-03-11 DIAGNOSIS — R29.3 ABNORMAL POSTURE: ICD-10-CM

## 2019-03-11 DIAGNOSIS — M54.2 NECK PAIN: ICD-10-CM

## 2019-03-11 DIAGNOSIS — R29.898 WEAKNESS OF BOTH LOWER EXTREMITIES: ICD-10-CM

## 2019-03-11 DIAGNOSIS — M54.50 LUMBAR PAIN: Primary | ICD-10-CM

## 2019-03-11 PROCEDURE — 97140 MANUAL THERAPY 1/> REGIONS: CPT

## 2019-03-11 PROCEDURE — 97110 THERAPEUTIC EXERCISES: CPT

## 2019-03-11 NOTE — THERAPY TREATMENT NOTE
Outpatient Physical Therapy Ortho Treatment Note  [facility]     Patient Name: Cayla Lopez  : 1960  MRN: [id number]  Today's Date: 3/11/2019      Visit Date: 2019    Visit Dx:    ICD-10-CM ICD-9-CM   1. Lumbar pain M54.5 724.2   2. Neck pain M54.2 723.1   3. Weakness of both lower extremities R29.898 729.89   4. Abnormal posture R29.3 781.92       Patient Active Problem List   Diagnosis   • Cervical radiculopathy   • Edema   • Ulnar nerve entrapment   • Fibromyalgia   • Insomnia   • Multiple sclerosis (CMS/HCC)   • Varicose veins of lower extremities   • Thumb tendonitis   • Osteoarthritis of right thumb   • Restless leg syndrome   • Primary osteoarthritis involving multiple joints   • Meningioma (CMS/HCC)   • Contusion of right knee   • Arthritis of right knee        Past Medical History:   Diagnosis Date   • MS (multiple sclerosis) (CMS/HCC)         No past surgical history on file.                    PT Assessment/Plan     Row Name 19 0800          PT Assessment    Assessment Comments  Ms. Lopez reports decreased pain today and noted she got up to walk more frequently at work and did some of the intermittent exercises at her desk.  Noted pain in T-L junction that responded to STM at [facility].  -SETH        PT Plan    PT Plan Comments  finalize HEP, education, assess goals  -SETH       User Key  (r) = Recorded By, (t) = Taken By, (c) = Cosigned By    Initials Name Provider Type    Jeni Connor, PT Physical Therapist              Exercises     Row Name 19 0700             Subjective Comments    Subjective Comments  Better, still some pain.  -SETH         Subjective Pain    Able to rate subjective pain?  yes  -SETH         Total Minutes    96123 - PT Therapeutic Exercise Minutes  25  -JA      50082 - PT Manual Therapy Minutes  15  -SETH         Exercise 1    Exercise Name 1  Nustep  -SETH      Time 1  5 min  -SETH         Exercise 2    Exercise Name 2  Reverse shoulder rolls  -SETH       Reps 2  5  -JA         Exercise 3    Exercise Name 3  scap ret and shld ext w/tband  -JA      Reps 3  15 eac  -JA      Additional Comments  red  -JA         Exercise 4    Exercise Name 4  Standing chin tuck  -JA      Reps 4  5  -JA         Exercise 5    Exercise Name 5  scap ret no resistance  -JA      Reps 5  5  -JA         Exercise 6    Exercise Name 6  Reviewed ergonomics of computer desk including seat height, keyboard tray, mouse placement, use of standing desk, suggested adding small step stool to prop foot while at standing desk, use of laptop.    -JA      Time 6  5 min  -JA         Exercise 7    Exercise Name 7  LTR  -JA         Exercise 8    Exercise Name 8  SKC  -JA      Reps 8  3  -JA      Time 8  10-15 sec  -JA         Exercise 9    Exercise Name 9  pre-stretch position for piriformis  -JA      Reps 9  3  -JA      Time 9  10 sec  -JA      Additional Comments  still pain with attempts beyond getting into position.  -JA         Exercise 12    Exercise Name 12  SL thor rotation  -JA        User Key  (r) = Recorded By, (t) = Taken By, (c) = Cosigned By    Initials Name Provider Type    Jeni Connor, PT Physical Therapist                        Manual Rx (last 36 hours)      Manual Treatments     Row Name 03/11/19 0800 03/11/19 0700          Total Minutes    29117 - PT Manual Therapy Minutes  --  15  -JA        Manual Rx 1    Manual Rx 1 Location  --  -JA  prone position, mid and LB  -JA     Manual Rx 1 Type  --  -JA  foam rolling, STM, gross mobs grade I-II  -JA     Manual Rx 1 Duration  --  -JA  15  -JA       User Key  (r) = Recorded By, (t) = Taken By, (c) = Cosigned By    Initials Name Provider Type    Jeni Connor, PT Physical Therapist              Therapy Education  Education Details: Reviewed standing posture, strategy for moving at work at least every hour but every 30 minutes would be best.   Given: Symptoms/condition management, Posture/body mechanics, Pain management  Program:  Reinforced, Progressed  How Provided: Verbal, Demonstration  Provided to: Patient  Level of Understanding: Teach back education performed              Time Calculation:   Start Time: 0700  Stop Time: 0745  Time Calculation (min): 45 min  Therapy Suggested Charges     Code   Minutes Charges    13323 (CPT®) Hc Pt Neuromusc Re Education Ea 15 Min      62727 (CPT®) Hc Pt Ther Proc Ea 15 Min 25 2    50146 (CPT®) Hc Gait Training Ea 15 Min      02843 (CPT®) Hc Pt Therapeutic Act Ea 15 Min      78119 (CPT®) Hc Pt Manual Therapy Ea 15 Min 15 1    03676 (CPT®) Hc Pt Ther Massage- Per 15 Min      65674 (CPT®) Hc Pt Iontophoresis Ea 15 Min      13627 (CPT®) Hc Pt Elec Stim Ea-Per 15 Min      16297 (CPT®) Hc Pt Ultrasound Ea 15 Min      40585 (CPT®) Hc Pt Self Care/Mgmt/Train Ea 15 Min      96107 (CPT®) Hc Pt Prosthetic (S) Train Initial Encounter, Each 15 Min      52820 (CPT®) Hc Orthotic(S) Mgmt/Train Initial Encounter, Each 15min      82956 (CPT®) Hc Pt Aquatic Therapy Ea 15 Min      41997 (CPT®) Hc Pt Orthotic(S)/Prosthetic(S) Encounter, Each 15 Min       (CPT®) Hc Pt Electrical Stim Unattended      Total  40 3        Therapy Charges for Today     Code Description Service Date Service Provider Modifiers Qty    20284066808 HC PT THER PROC EA 15 MIN 3/11/2019 Jeni French, PT GP 2    42892857752 HC PT MANUAL THERAPY EA 15 MIN 3/11/2019 Jeni French PT GP 1                    Jeni French, PT  3/11/2019

## 2019-03-14 ENCOUNTER — HOSPITAL ENCOUNTER (OUTPATIENT)
Dept: PHYSICAL THERAPY | Facility: HOSPITAL | Age: 59
Setting detail: THERAPIES SERIES
Discharge: HOME | End: 2019-03-14

## 2019-03-14 DIAGNOSIS — R29.898 WEAKNESS OF BOTH LOWER EXTREMITIES: ICD-10-CM

## 2019-03-14 DIAGNOSIS — R29.3 ABNORMAL POSTURE: ICD-10-CM

## 2019-03-14 DIAGNOSIS — M54.2 NECK PAIN: ICD-10-CM

## 2019-03-14 DIAGNOSIS — M54.50 LUMBAR PAIN: Primary | ICD-10-CM

## 2019-03-14 PROCEDURE — 97110 THERAPEUTIC EXERCISES: CPT

## 2019-03-14 PROCEDURE — 97140 MANUAL THERAPY 1/> REGIONS: CPT

## 2019-03-19 NOTE — THERAPY DISCHARGE NOTE
Outpatient Physical Therapy Ortho Treatment Note/Discharge Summary  [facility]     Patient Name: Cayla Lopez  : 1960  MRN: [id number]  Today's Date: 3/14/2019      Visit Date: 2019    Visit Dx:    ICD-10-CM ICD-9-CM   1. Lumbar pain M54.5 724.2   2. Neck pain M54.2 723.1   3. Weakness of both lower extremities R29.898 729.89   4. Abnormal posture R29.3 781.92       Patient Active Problem List   Diagnosis   • Cervical radiculopathy   • Edema   • Ulnar nerve entrapment   • Fibromyalgia   • Insomnia   • Multiple sclerosis (CMS/HCC)   • Varicose veins of lower extremities   • Thumb tendonitis   • Osteoarthritis of right thumb   • Restless leg syndrome   • Primary osteoarthritis involving multiple joints   • Meningioma (CMS/HCC)   • Contusion of right knee   • Arthritis of right knee        Past Medical History:   Diagnosis Date   • MS (multiple sclerosis) (CMS/HCC)         No past surgical history on file.          19 0700   PT Assessment   Assessment Comments Ms. Lopez verbalizes understanding of direct correlation of prolonged sitting with poor postures at work and pain and stiffness later in the day and the next morning.  Yesterday at work she realized she haad not moved frequently or at all for a long time and noted increased pain and stiffness.  She is able to demonstrate advanced HEP correctly for help in self-management of symptoms.  She has met all goals for therapy.  Pt understands that management of her symptoms through strategies we worked on such as interrupting her work every 30-60 minutes to check posture (straighten up), roll shoulders, and perform chin tucks.  She notes when she has done this it did help decrease her pain/stiffness.  She is discharged from therapy.   PT Plan   PT Plan Comments D/C to HEP         19 0700   Subjective Comments   Subjective Comments I was bad yesterday, it was super busy at work so I didn't move much.   Subjective Pain   Able to rate  subjective pain? yes   Pre-Treatment Pain Level 6   Total Minutes   62843 - PT Therapeutic Exercise Minutes 25   Exercise 1   Exercise Name 1 Nustep   Time 1 5 min   Exercise 2   Exercise Name 2 Reverse shoulder rolls   Reps 2 5   Exercise 3   Exercise Name 3 scap ret and shld ext w/tband   Reps 3 15 eac   Exercise 4   Exercise Name 4 Standing chin tuck   Reps 4 5   Exercise 5   Exercise Name 5 scap ret w/tband   Reps 5 5   Additional Comments RED   Exercise 6   Exercise Name 6 Reviewed ergonomics of computer desk including seat height, keyboard tray, mouse placement, use of standing desk, suggested adding small step stool to prop foot while at standing desk, use of laptop.     Time 6 5 min   Exercise 7   Exercise Name 7 LTR   Exercise 8   Exercise Name 8 SKC   Reps 8 3   Time 8 10-15 sec   Exercise 9   Exercise Name 9 pre-stretch position for piriformis   Reps 9 3   Time 9 10 sec   Exercise 10   Exercise Name 10 bent knee fallout w/t.a. stabilization   Reps 10 5 ea   Time 10 5sec   Exercise 12   Exercise Name 12 SL thor rotation   Reps 12 5 ea   Exercise 13   Exercise Name 13 UT stretch   Cueing 13 Verbal;Demo   Reps 13 2 ea   Time 13 10 sec   Additional Comments pt independent   Exercise 14   Exercise Name 14 Levator stretch   Cueing 14 Verbal;Demo   Reps 14 2   Time 14 10 sec    Additional Comments pt independent           03/14/19 0700   Total Minutes   75566 - PT Manual Therapy Minutes 15   Manual Rx 1   Manual Rx 1 Location prone position, mid and LB   Manual Rx 1 Type foam rolling, STM thor and cerv pvm, gross mobs grade I-II   Manual Rx 1 Duration 15           03/14/19 0700   PT Short Term Goals   STG Date to Achieve 02/06/19   STG 1 Patient will be independent and compliant with initial HEP    STG 1 Progress Met   STG 2 Pt will demonstrate correct posture in sitting and standing.   STG 2 Progress Met   STG 3 Pt will be able to move through 75% of full cervical AROM without increased pain.    STG 3 Progress  Partially Met   Long Term Goals   LTG Date to Achieve 02/23/19   LTG 1 Pt will be independent with advanced HEP for spinal stabilization and UE/LE/core strengthening.   LTG 1 Progress Met   LTG 1 Progress Comments verbalized and demonstrated for teachback   LTG 2 Pt will demonstrate correct body mechanics with ADL's and work activity to decrease strain on spine.   LTG 2 Progress Met   LTG 2 Progress Comments pt demonstrates correct body mechanics in clinic but does not always use at work due to limitations in seating/equipment   LTG 3 Pt will be able to move through 50-75% of full trunk ROM without increased pain    LTG 3 Progress Partially Met   LTG 3 Progress Comments able to move through the range but has some increase in pain   LTG 4 Pt will score 44% on Oswestry Disability Index indicating decrease in perceived functional disability.    LTG 4 Progress Met   LTG 5 Pt will score 34% or less on NDI indicating decrease in perceived functional disability.    LTG 5 Progress Met                   Therapy Education  Education Details: Reviewed finalized HEP including 2 neck stretches and reviewed strategies for pain management  Given: Symptoms/condition management, Posture/body mechanics, Pain management  Program: Reinforced, Progressed  How Provided: Verbal, Demonstration  Provided to: Patient  Level of Understanding: Teach back education performed              Time Calculation:   Start Time: 0700  Stop Time: 0745  Time Calculation (min): 45 min            OP PT Discharge Summary  Date of Discharge: 03/14/19  Reason for Discharge: All goals achieved  Outcomes Achieved: Patient able to partially acheive established goals, Able to achieve all goals within established timeline  Discharge Destination: Home with home program      Jeni French, PT  3/14/2019

## 2019-05-24 ENCOUNTER — OFFICE VISIT (OUTPATIENT)
Dept: FAMILY MEDICINE CLINIC | Facility: CLINIC | Age: 59
End: 2019-05-24

## 2019-05-24 VITALS
HEIGHT: 67 IN | HEART RATE: 79 BPM | WEIGHT: 232 LBS | TEMPERATURE: 98.1 F | SYSTOLIC BLOOD PRESSURE: 120 MMHG | BODY MASS INDEX: 36.41 KG/M2 | DIASTOLIC BLOOD PRESSURE: 74 MMHG | OXYGEN SATURATION: 98 %

## 2019-05-24 DIAGNOSIS — M54.50 CHRONIC RIGHT-SIDED LOW BACK PAIN WITHOUT SCIATICA: Primary | ICD-10-CM

## 2019-05-24 DIAGNOSIS — M50.90 CERVICAL DISC DISEASE: ICD-10-CM

## 2019-05-24 DIAGNOSIS — G25.81 RESTLESS LEG SYNDROME: ICD-10-CM

## 2019-05-24 DIAGNOSIS — G89.29 CHRONIC RIGHT-SIDED LOW BACK PAIN WITHOUT SCIATICA: Primary | ICD-10-CM

## 2019-05-24 PROCEDURE — 99214 OFFICE O/P EST MOD 30 MIN: CPT | Performed by: INTERNAL MEDICINE

## 2019-05-24 RX ORDER — PREGABALIN 75 MG/1
75 CAPSULE ORAL 2 TIMES DAILY
Qty: 28 CAPSULE | Refills: 0 | Status: SHIPPED | OUTPATIENT
Start: 2019-05-24 | End: 2019-07-16

## 2019-05-24 NOTE — PROGRESS NOTES
Subjective Chief complaint is follow-up for her neck and back pain but also restless legs  Cayla Lopez is a 58 y.o. female.     History of Present Illness   Cayla is here today for follow-up.  Since her last visit she has completed physical therapy for her chronic moderate  neck and back pain and really did not get much benefit from this.  She is wondering what the next step is.  We did review her MRI scan of the cervical spine from 2015.  She had multilevel disc disease.  She also has a past history of MS which complicates things somewhat.  She is not on any medication for this.  She does have restless legs and is already on a more than maximum dose of Mirapex and it is not helping.  She is only getting at most 4 hours of sleep per night.  She is also reporting some increased daytime restless leg symptoms.  She was on some gabapentin but it was at a very low dose.  She is no longer taking this.  She is not taking any of the baclofen.  The following portions of the patient's history were reviewed and updated as appropriate: allergies, current medications, past family history, past medical history, past social history, past surgical history and problem list.    Review of Systems   Constitutional: Positive for fatigue.   Musculoskeletal: Positive for back pain, neck pain and neck stiffness.   Psychiatric/Behavioral: Positive for sleep disturbance.       Objective   Physical Exam   Constitutional: She appears well-developed and well-nourished.   Cardiovascular: Normal rate.   Pulmonary/Chest: Effort normal.   Musculoskeletal:   She does have diminished right and left lateral rotation of the neck.  There is diminished adduction to the left.  She has tenderness in the right lumbar paraspinous area.   Nursing note and vitals reviewed.        Assessment/Plan   Cayla was seen today for follow-up.    Diagnoses and all orders for this visit:    Chronic right-sided low back pain without sciatica  -     Ambulatory Referral to Pain  Management    Cervical disc disease  -     Ambulatory Referral to Pain Management    Restless leg syndrome    Other orders  -     pregabalin (LYRICA) 75 MG capsule; Take 1 capsule by mouth 2 (Two) Times a Day.    Cayla is here today for back pain and restless legs.  She is on a more than maximum dose for Mirapex and it is not effective.  I did discuss that we could either do a higher dose of gabapentin or try Lyrica and see if it would help.  This may also help with some of her neck and back discomfort.  We are going to try Lyrica at 75 mg twice daily as a trial.  She is going to call me in a few weeks.  We may need to increase this up a bit.  We did discuss that she may want to get back to see her neurologist.  They may actually want to try something such as Sinemet for her restless legs it seems to also be causing some daytime symptoms as well..

## 2019-07-02 RX ORDER — PRAMIPEXOLE DIHYDROCHLORIDE 1.5 MG/1
1.5 TABLET ORAL NIGHTLY
Qty: 30 TABLET | Refills: 3 | Status: SHIPPED | OUTPATIENT
Start: 2019-07-02 | End: 2019-10-25 | Stop reason: SDUPTHER

## 2019-07-16 ENCOUNTER — OFFICE VISIT (OUTPATIENT)
Dept: PAIN MEDICINE | Facility: CLINIC | Age: 59
End: 2019-07-16

## 2019-07-16 VITALS
SYSTOLIC BLOOD PRESSURE: 133 MMHG | RESPIRATION RATE: 18 BRPM | HEART RATE: 84 BPM | BODY MASS INDEX: 37.01 KG/M2 | TEMPERATURE: 97.8 F | HEIGHT: 67 IN | WEIGHT: 235.8 LBS | OXYGEN SATURATION: 97 % | DIASTOLIC BLOOD PRESSURE: 82 MMHG

## 2019-07-16 DIAGNOSIS — M54.5 CHRONIC RIGHT-SIDED LOW BACK PAIN, WITH SCIATICA PRESENCE UNSPECIFIED: ICD-10-CM

## 2019-07-16 DIAGNOSIS — G89.29 CHRONIC RIGHT-SIDED LOW BACK PAIN, WITH SCIATICA PRESENCE UNSPECIFIED: ICD-10-CM

## 2019-07-16 DIAGNOSIS — G89.29 OTHER CHRONIC PAIN: Primary | ICD-10-CM

## 2019-07-16 LAB
POC AMPHETAMINES: NEGATIVE
POC BARBITURATES: NEGATIVE
POC BENZODIAZEPHINES: NEGATIVE
POC COCAINE: NEGATIVE
POC METHADONE: NEGATIVE
POC METHAMPHETAMINE SCREEN URINE: NEGATIVE
POC OPIATES: NEGATIVE
POC OXYCODONE: NEGATIVE
POC PHENCYCLIDINE: NEGATIVE
POC PROPOXYPHENE: NEGATIVE
POC THC: NEGATIVE
POC TRICYCLIC ANTIDEPRESSANTS: NEGATIVE

## 2019-07-16 PROCEDURE — 80305 DRUG TEST PRSMV DIR OPT OBS: CPT | Performed by: NURSE PRACTITIONER

## 2019-07-16 PROCEDURE — 99204 OFFICE O/P NEW MOD 45 MIN: CPT | Performed by: NURSE PRACTITIONER

## 2019-07-16 RX ORDER — MELOXICAM 15 MG/1
15 TABLET ORAL DAILY
Qty: 30 TABLET | Refills: 1 | Status: SHIPPED | OUTPATIENT
Start: 2019-07-16 | End: 2019-07-16 | Stop reason: SDUPTHER

## 2019-07-16 NOTE — PROGRESS NOTES
CHIEF COMPLAINT  Ms. Lopez states that her back and neck pain started about 5 years ago but has gotten worse in the last 4-5 months. She has previously had PT for her neck and back that didn't help. She has also seen a Chiropractor for her back and neck pain that did help some. She states that her low back pain is on the right side and radiates to her right buttock.Her neck pain radiates to both her shoulders.    Subjective   Cayla Lopez is a 58 y.o. female.   She presents to the office for evaluation of back and neck pain. She was referred here by Dr. Crowell (PCP).     Patient reports chronic neck and back pain.  Has become progressively worse over the last 4-5 months.  She completed Physical Therapy recently which she states did not help.  Has had some chiropractic treatment with some benefit, but could not afford.  No history of surgery.  Has not seen a neurosurgeon.  Has been referred here by her PCP to consider interventional options.      C/o low back pain. The pain is located in the right lumbosacral area.  The pain is aggravated by laying, sitting, sleeping.  It is improved some with Naproxen which takes the edge off.      C/o neck pain. Pain is located bilaterally in the occipital and upper neck (right > left). This radiates down into the shoulder. This is aggravated by working on the computer.     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  Lives at home with her . Does not use illicit substances.  Does not smoke cigarettes. Has a very occasional drink on a social occasion.      History of Present Illness     PEG Assessment   What number best describes your pain on average in the past week?7  What number best describes how, during the past week, pain has interfered with your enjoyment of life?6  What number best describes how, during the past week, pain has interfered with your general activity?   "6      Current Outpatient Medications:   •  Cholecalciferol (VITAMIN D3) 2000 units tablet, Take  by mouth., Disp: , Rfl:   •  pramipexole (MIRAPEX) 1.5 MG tablet, Take 1 tablet by mouth Every Night. TAKE ONE TABLET AT BEDTIME, Disp: 30 tablet, Rfl: 3  •  meloxicam (MOBIC) 15 MG tablet, Take 1 tablet by mouth Daily., Disp: 30 tablet, Rfl: 1    The following portions of the patient's history were reviewed and updated as appropriate: allergies, current medications, past family history, past medical history, past social history, past surgical history and problem list.    REVIEW OF PERTINENT MEDICAL DATA    She was referred to our practice by her primary care provider Dr. Crowell.  Reporting chronic neck and back pain.  Completed physical therapy with little benefit.  Pain is interfering with her sleep.  Given trial of low-dose Lyrica.                STOVALL = 13    Review of Systems   Constitutional: Positive for fatigue.   HENT: Negative for congestion.    Eyes: Negative for visual disturbance.   Respiratory: Negative for cough, shortness of breath and wheezing.    Cardiovascular: Negative.    Gastrointestinal: Negative for constipation and diarrhea.   Genitourinary: Negative for difficulty urinating.   Musculoskeletal: Positive for back pain and joint swelling (bilateral ankles).   Neurological: Negative for weakness and numbness.   Psychiatric/Behavioral: Positive for sleep disturbance. Negative for suicidal ideas. The patient is not nervous/anxious.      Vitals:    07/16/19 1455   BP: 133/82   Pulse: 84   Resp: 18   Temp: 97.8 °F (36.6 °C)   SpO2: 97%   Weight: 107 kg (235 lb 12.8 oz)   Height: 170.2 cm (67.01\")   PainSc:   6   PainLoc: Back     Objective   Physical Exam   Constitutional: She is oriented to person, place, and time. She appears well-developed and well-nourished. She is cooperative. No distress.   HENT:   Head: Normocephalic and atraumatic.   Right Ear: External ear normal.   Left Ear: External ear " normal.   Nose: Nose normal.   Eyes: Conjunctivae, EOM and lids are normal. Pupils are equal, round, and reactive to light.   Neck: Trachea normal and normal range of motion. Neck supple.   Cardiovascular: Normal rate, regular rhythm and normal heart sounds.   Pulmonary/Chest: Effort normal and breath sounds normal. No respiratory distress.   Abdominal: Soft. Normal appearance and bowel sounds are normal. She exhibits no distension. There is no tenderness. There is no rebound and no guarding.   Musculoskeletal: Normal range of motion. She exhibits no deformity.        Cervical back: She exhibits tenderness, bony tenderness and spasm.        Lumbar back: She exhibits tenderness, bony tenderness and pain.   +lumbar facet tenderness  +right si joint tenderness  +SLR Right    Neurological: She is alert and oriented to person, place, and time. She has normal strength and normal reflexes. No cranial nerve deficit or sensory deficit. Coordination and gait normal.   Reflex Scores:       Patellar reflexes are 2+ on the right side and 2+ on the left side.       Achilles reflexes are 2+ on the right side and 2+ on the left side.  Skin: Skin is warm, dry and intact. She is not diaphoretic.   Psychiatric: She has a normal mood and affect. Her speech is normal and behavior is normal. Judgment and thought content normal. Cognition and memory are normal.   Nursing note and vitals reviewed.    Assessment/Plan   Cayla was seen today for back pain and neck pain.    Diagnoses and all orders for this visit:    Other chronic pain  -     Urine Drug Screen Confirmation - Urine, Clean Catch; Future  -     POC Urine Drug Screen, Triage    Chronic right-sided low back pain, with sciatica presence unspecified  -     Urine Drug Screen Confirmation - Urine, Clean Catch; Future  -     POC Urine Drug Screen, Triage  -     MRI Lumbar Spine Without Contrast; Future    Other orders  -     meloxicam (MOBIC) 15 MG tablet; Take 1 tablet by mouth  Daily.      --- Update MRI Lumbar spine  --- Trial Meloxicam. Discussed medication with the patient.  Included in this discussion was the potential for side effects and adverse events.  Patient verbalized understanding and wished to proceed.  Prescription will be sent to pharmacy.  --- Routine UDS in office today as part of monitoring requirements for controlled substances.  The specimen was viewed and the immunoassay result reviewed and is NEG.  This specimen will be sent to [facility] laboratory for confirmation.     --- Consider right LTFESI - pending MRI, old MRI showed right L5/S1 disc herniation   --- Consider right SI joint injection   --- Follow-up after imaging          RYAN REPORT  RYAN report has been reviewed and scanned into the patient's chart.    As the clinician, I personally reviewed the RYAN from 7/16/19 while the patient was in the office today.    EMR Dragon/Transcription disclaimer:   Much of this encounter note is an electronic transcription/translation of spoken language to printed text. The electronic translation of spoken language may permit erroneous, or at times, nonsensical words or phrases to be inadvertently transcribed; Although I have reviewed the note for such errors, some may still exist.

## 2019-07-17 RX ORDER — MELOXICAM 15 MG/1
15 TABLET ORAL DAILY
Qty: 90 TABLET | Refills: 1 | Status: SHIPPED | OUTPATIENT
Start: 2019-07-17 | End: 2020-01-17

## 2019-07-21 ENCOUNTER — RESULTS ENCOUNTER (OUTPATIENT)
Dept: PAIN MEDICINE | Facility: CLINIC | Age: 59
End: 2019-07-21

## 2019-07-21 DIAGNOSIS — G89.29 CHRONIC RIGHT-SIDED LOW BACK PAIN, WITH SCIATICA PRESENCE UNSPECIFIED: ICD-10-CM

## 2019-07-21 DIAGNOSIS — M54.5 CHRONIC RIGHT-SIDED LOW BACK PAIN, WITH SCIATICA PRESENCE UNSPECIFIED: ICD-10-CM

## 2019-07-21 DIAGNOSIS — G89.29 OTHER CHRONIC PAIN: ICD-10-CM

## 2019-08-05 ENCOUNTER — APPOINTMENT (OUTPATIENT)
Dept: MRI IMAGING | Facility: HOSPITAL | Age: 59
End: 2019-08-05

## 2019-08-20 ENCOUNTER — APPOINTMENT (OUTPATIENT)
Dept: MRI IMAGING | Facility: HOSPITAL | Age: 59
End: 2019-08-20

## 2019-10-25 ENCOUNTER — OFFICE VISIT (OUTPATIENT)
Dept: FAMILY MEDICINE CLINIC | Facility: CLINIC | Age: 59
End: 2019-10-25

## 2019-10-25 VITALS
OXYGEN SATURATION: 98 % | HEIGHT: 67 IN | SYSTOLIC BLOOD PRESSURE: 120 MMHG | DIASTOLIC BLOOD PRESSURE: 80 MMHG | HEART RATE: 84 BPM | TEMPERATURE: 97.5 F | WEIGHT: 244.4 LBS | BODY MASS INDEX: 38.36 KG/M2

## 2019-10-25 DIAGNOSIS — D32.9 MENINGIOMA (HCC): ICD-10-CM

## 2019-10-25 DIAGNOSIS — G35 MULTIPLE SCLEROSIS (HCC): ICD-10-CM

## 2019-10-25 DIAGNOSIS — Z00.00 ANNUAL PHYSICAL EXAM: Primary | ICD-10-CM

## 2019-10-25 DIAGNOSIS — G25.81 RESTLESS LEG SYNDROME: ICD-10-CM

## 2019-10-25 DIAGNOSIS — Z12.31 ENCOUNTER FOR SCREENING MAMMOGRAM FOR MALIGNANT NEOPLASM OF BREAST: ICD-10-CM

## 2019-10-25 PROCEDURE — 99396 PREV VISIT EST AGE 40-64: CPT | Performed by: INTERNAL MEDICINE

## 2019-10-25 RX ORDER — PRAMIPEXOLE DIHYDROCHLORIDE 1.5 MG/1
1.5 TABLET ORAL NIGHTLY
Qty: 30 TABLET | Refills: 3 | Status: SHIPPED | OUTPATIENT
Start: 2019-10-25 | End: 2020-02-18

## 2019-10-25 RX ORDER — GABAPENTIN 300 MG/1
300 CAPSULE ORAL NIGHTLY
Qty: 30 CAPSULE | Refills: 5 | Status: SHIPPED | OUTPATIENT
Start: 2019-10-25 | End: 2020-06-22 | Stop reason: SDUPTHER

## 2019-10-25 NOTE — PROGRESS NOTES
Subjective Chief complaint is annual physical exam  Cayla Lopez is a 58 y.o. female.     History of Present Illness Cayla is here today for an annual physical exam.  She does have multiple sclerosis.  She basically has refused to see neurologist for this because she does not like the way the medicines make her feel.  She also has a history of a meningioma.  It was last imaged in 2015.  This was done at a [facility] facility.  I did review the report from that.  She is a little bit lax on her gynecologic exams.  She reportedly has an upcoming appointment with Dr. Costa.  She has not had a mammogram in a number of years.  She did recently receive a flu shot.  Her biggest problem seems to be her restless legs.  She is on a fairly maximum dose of Mirapex and it is not helping.  She has been on gabapentin in the past for other issues but not for restless legs.  She is unclear whether it may have helped at that time.  The restless legs are the main reason that she cannot sleep and she seems to be having a lot of daytime drowsiness.  Social history family history and past surgical history were reviewed.  The following portions of the patient's history were reviewed and updated as appropriate: allergies, current medications, past family history, past medical history, past social history, past surgical history and problem list.    Review of Systems   Constitutional: Positive for activity change.   HENT: Negative for congestion, nosebleeds, sore throat and trouble swallowing.    Eyes: Negative for visual disturbance.   Respiratory: Negative for chest tightness and shortness of breath.    Cardiovascular: Negative for chest pain, palpitations and leg swelling.   Gastrointestinal: Negative for abdominal pain, anal bleeding, constipation and diarrhea.   Genitourinary: Negative for dysuria, hematuria and urinary incontinence.   Musculoskeletal: Positive for arthralgias.   Skin: Negative for rash.   Neurological: Negative for dizziness,  weakness, light-headedness, numbness and headache.   Hematological: Does not bruise/bleed easily.   Psychiatric/Behavioral: Positive for sleep disturbance.       Objective   Physical Exam   Constitutional: She is oriented to person, place, and time. She appears well-developed and well-nourished.   HENT:   Head: Normocephalic and atraumatic.   Eyes: Conjunctivae and EOM are normal. Pupils are equal, round, and reactive to light. No scleral icterus.   Funduscopic exam shows no papilledema   Neck: No JVD present. No tracheal deviation present. No thyromegaly present.   Cardiovascular: Normal rate, regular rhythm, normal heart sounds and intact distal pulses. Exam reveals no gallop and no friction rub.   No murmur heard.  Pulmonary/Chest: Effort normal and breath sounds normal. No respiratory distress. She has no wheezes. She has no rales.   Abdominal: Soft. Bowel sounds are normal. She exhibits no mass. There is no tenderness. There is no rebound and no guarding.   Musculoskeletal: She exhibits no edema.   She does have some hyperextension of the knees and crepitation of the knees.   Lymphadenopathy:     She has no cervical adenopathy.   Neurological: She is alert and oriented to person, place, and time. No cranial nerve deficit. Coordination normal.   Skin: Skin is warm and dry.   Psychiatric: She has a normal mood and affect.   Nursing note and vitals reviewed.        Assessment/Plan   Cayla was seen today for annual exam.    Diagnoses and all orders for this visit:    Annual physical exam  -     CBC & Differential  -     Comprehensive Metabolic Panel  -     Lipid Panel  -     TSH+Free T4    Encounter for screening mammogram for malignant neoplasm of breast  -     Mammo Screening Bilateral With CAD    Multiple sclerosis (CMS/HCC)  -     MRI Brain With & Without Contrast; Future    Meningioma (CMS/HCC)  -     MRI Brain With & Without Contrast; Future    Restless leg syndrome  -     CBC & Differential  -     TSH+Free  T4  -     Iron Profile    Other orders  -     gabapentin (NEURONTIN) 300 MG capsule; Take 1 capsule by mouth Every Night.      Cayla is here today for an annual physical exam.  We did advise her of the importance of routine gynecologic exams.  We did advise her of the importance of annual mammograms.  I am going to schedule a mammogram at this point.  She does claim she has an appointment with a gynecologist.  We did discuss the importance of vaccines.  She has had a recent tetanus but not a recent Tdap.  We also discussed the importance of annual flu vaccines.  Otherwise she seems to be up-to-date on her screening exams.  For her restless leg syndrome I am going to add some gabapentin.  I am going to check some laboratory work on a routine basis but also some iron studies for her restless legs.  For her meningioma I am going to get a follow-up scan. I did advise her of the importance of healthy diet and maintaining good exercise.

## 2019-10-26 LAB
ALBUMIN SERPL-MCNC: 4.6 G/DL (ref 3.5–5.2)
ALBUMIN/GLOB SERPL: 2.2 G/DL
ALP SERPL-CCNC: 88 U/L (ref 39–117)
ALT SERPL-CCNC: 16 U/L (ref 1–33)
AST SERPL-CCNC: 22 U/L (ref 1–32)
BASOPHILS # BLD AUTO: 0.07 10*3/MM3 (ref 0–0.2)
BASOPHILS NFR BLD AUTO: 1.1 % (ref 0–1.5)
BILIRUB SERPL-MCNC: 1.3 MG/DL (ref 0.2–1.2)
BUN SERPL-MCNC: 21 MG/DL (ref 6–20)
BUN/CREAT SERPL: 25 (ref 7–25)
CALCIUM SERPL-MCNC: 8.8 MG/DL (ref 8.6–10.5)
CHLORIDE SERPL-SCNC: 106 MMOL/L (ref 98–107)
CHOLEST SERPL-MCNC: 213 MG/DL (ref 0–200)
CO2 SERPL-SCNC: 25.4 MMOL/L (ref 22–29)
CREAT SERPL-MCNC: 0.84 MG/DL (ref 0.57–1)
EOSINOPHIL # BLD AUTO: 0.17 10*3/MM3 (ref 0–0.4)
EOSINOPHIL NFR BLD AUTO: 2.7 % (ref 0.3–6.2)
ERYTHROCYTE [DISTWIDTH] IN BLOOD BY AUTOMATED COUNT: 12.4 % (ref 12.3–15.4)
GLOBULIN SER CALC-MCNC: 2.1 GM/DL
GLUCOSE SERPL-MCNC: 88 MG/DL (ref 65–99)
HCT VFR BLD AUTO: 38.7 % (ref 34–46.6)
HDLC SERPL-MCNC: 72 MG/DL (ref 40–60)
HGB BLD-MCNC: 13.2 G/DL (ref 12–15.9)
IMM GRANULOCYTES # BLD AUTO: 0.02 10*3/MM3 (ref 0–0.05)
IMM GRANULOCYTES NFR BLD AUTO: 0.3 % (ref 0–0.5)
IRON SATN MFR SERPL: 17 % (ref 20–50)
IRON SERPL-MCNC: 59 MCG/DL (ref 37–145)
LDLC SERPL CALC-MCNC: 128 MG/DL (ref 0–100)
LYMPHOCYTES # BLD AUTO: 2.24 10*3/MM3 (ref 0.7–3.1)
LYMPHOCYTES NFR BLD AUTO: 35.7 % (ref 19.6–45.3)
MCH RBC QN AUTO: 29.9 PG (ref 26.6–33)
MCHC RBC AUTO-ENTMCNC: 34.1 G/DL (ref 31.5–35.7)
MCV RBC AUTO: 87.8 FL (ref 79–97)
MONOCYTES # BLD AUTO: 0.52 10*3/MM3 (ref 0.1–0.9)
MONOCYTES NFR BLD AUTO: 8.3 % (ref 5–12)
NEUTROPHILS # BLD AUTO: 3.25 10*3/MM3 (ref 1.7–7)
NEUTROPHILS NFR BLD AUTO: 51.9 % (ref 42.7–76)
NRBC BLD AUTO-RTO: 0 /100 WBC (ref 0–0.2)
PLATELET # BLD AUTO: 240 10*3/MM3 (ref 140–450)
POTASSIUM SERPL-SCNC: 4.7 MMOL/L (ref 3.5–5.2)
PROT SERPL-MCNC: 6.7 G/DL (ref 6–8.5)
RBC # BLD AUTO: 4.41 10*6/MM3 (ref 3.77–5.28)
SODIUM SERPL-SCNC: 144 MMOL/L (ref 136–145)
T4 FREE SERPL-MCNC: 1.19 NG/DL (ref 0.93–1.7)
TIBC SERPL-MCNC: 353 MCG/DL
TRIGL SERPL-MCNC: 63 MG/DL (ref 0–150)
TSH SERPL DL<=0.005 MIU/L-ACNC: 3.18 UIU/ML (ref 0.27–4.2)
UIBC SERPL-MCNC: 294 MCG/DL (ref 112–346)
VLDLC SERPL CALC-MCNC: 12.6 MG/DL
WBC # BLD AUTO: 6.27 10*3/MM3 (ref 3.4–10.8)

## 2019-11-26 ENCOUNTER — APPOINTMENT (OUTPATIENT)
Dept: MAMMOGRAPHY | Facility: HOSPITAL | Age: 59
End: 2019-11-26

## 2019-11-26 ENCOUNTER — APPOINTMENT (OUTPATIENT)
Dept: MRI IMAGING | Facility: HOSPITAL | Age: 59
End: 2019-11-26

## 2019-12-13 ENCOUNTER — HOSPITAL ENCOUNTER (OUTPATIENT)
Dept: MAMMOGRAPHY | Facility: HOSPITAL | Age: 59
Discharge: HOME | End: 2019-12-13
Admitting: INTERNAL MEDICINE

## 2019-12-13 ENCOUNTER — HOSPITAL ENCOUNTER (OUTPATIENT)
Dept: MRI IMAGING | Facility: HOSPITAL | Age: 59
Discharge: HOME | End: 2019-12-13

## 2019-12-13 DIAGNOSIS — G35 MULTIPLE SCLEROSIS (HCC): ICD-10-CM

## 2019-12-13 DIAGNOSIS — D32.9 MENINGIOMA (HCC): ICD-10-CM

## 2019-12-13 LAB — CREAT BLDA-MCNC: 0.8 MG/DL (ref 0.6–1.3)

## 2019-12-13 PROCEDURE — 77063 BREAST TOMOSYNTHESIS BI: CPT

## 2019-12-13 PROCEDURE — 82565 ASSAY OF CREATININE: CPT

## 2019-12-13 PROCEDURE — 0 GADOBENATE DIMEGLUMINE 529 MG/ML SOLUTION: Performed by: INTERNAL MEDICINE

## 2019-12-13 PROCEDURE — A9577 INJ MULTIHANCE: HCPCS | Performed by: INTERNAL MEDICINE

## 2019-12-13 PROCEDURE — 70553 MRI BRAIN STEM W/O & W/DYE: CPT

## 2019-12-13 PROCEDURE — 77067 SCR MAMMO BI INCL CAD: CPT

## 2019-12-13 RX ADMIN — GADOBENATE DIMEGLUMINE 20 ML: 529 INJECTION, SOLUTION INTRAVENOUS at 16:38

## 2020-01-03 ENCOUNTER — OFFICE VISIT (OUTPATIENT)
Dept: FAMILY MEDICINE CLINIC | Facility: CLINIC | Age: 60
End: 2020-01-03

## 2020-01-03 VITALS
DIASTOLIC BLOOD PRESSURE: 80 MMHG | TEMPERATURE: 98.4 F | RESPIRATION RATE: 16 BRPM | OXYGEN SATURATION: 98 % | WEIGHT: 248 LBS | HEART RATE: 70 BPM | SYSTOLIC BLOOD PRESSURE: 140 MMHG | BODY MASS INDEX: 38.92 KG/M2 | HEIGHT: 67 IN

## 2020-01-03 DIAGNOSIS — M62.838 CERVICAL PARASPINAL MUSCLE SPASM: Primary | ICD-10-CM

## 2020-01-03 PROCEDURE — 96372 THER/PROPH/DIAG INJ SC/IM: CPT | Performed by: INTERNAL MEDICINE

## 2020-01-03 PROCEDURE — 99214 OFFICE O/P EST MOD 30 MIN: CPT | Performed by: INTERNAL MEDICINE

## 2020-01-03 RX ORDER — METHYLPREDNISOLONE ACETATE 80 MG/ML
80 INJECTION, SUSPENSION INTRA-ARTICULAR; INTRALESIONAL; INTRAMUSCULAR; SOFT TISSUE ONCE
Status: COMPLETED | OUTPATIENT
Start: 2020-01-03 | End: 2020-01-03

## 2020-01-03 RX ORDER — METHYLPREDNISOLONE ACETATE 40 MG/ML
40 INJECTION, SUSPENSION INTRA-ARTICULAR; INTRALESIONAL; INTRAMUSCULAR; SOFT TISSUE ONCE
Status: DISCONTINUED | OUTPATIENT
Start: 2020-01-03 | End: 2020-01-03

## 2020-01-03 RX ORDER — METHYLPREDNISOLONE 4 MG/1
TABLET ORAL
Qty: 21 TABLET | Refills: 0 | Status: SHIPPED | OUTPATIENT
Start: 2020-01-03 | End: 2020-06-22

## 2020-01-03 RX ORDER — TIZANIDINE 4 MG/1
4 TABLET ORAL EVERY 8 HOURS PRN
Qty: 21 TABLET | Refills: 1 | Status: SHIPPED | OUTPATIENT
Start: 2020-01-03 | End: 2020-01-20

## 2020-01-03 RX ADMIN — METHYLPREDNISOLONE ACETATE 80 MG: 80 INJECTION, SUSPENSION INTRA-ARTICULAR; INTRALESIONAL; INTRAMUSCULAR; SOFT TISSUE at 12:23

## 2020-01-03 NOTE — PROGRESS NOTES
Subjective Complaint is neck pain  Cayla Lopez is a 59 y.o. female.     History of Present Illness   Cayla is here today for not injury neck pain.  This began last evening.  It is more prominent on the left side.  It has gradually become worse.  She is having trouble turning her neck.  She did try taking some ibuprofen which really did not seem to help much.  She is not having fever or chills.  She has had a left-sided occipital headache but no visual disturbance.  Not having any significant gastrointestinal symptoms such as nausea or diarrhea.  She has had some mild upper respiratory symptoms.  Past history is remarkable for multiple sclerosis and cervical radiculopathy.  The following portions of the patient's history were reviewed and updated as appropriate: allergies, current medications, past family history, past medical history, past social history, past surgical history and problem list.    Review of Systems   Constitutional: Negative for chills and fever.   HENT: Positive for postnasal drip and rhinorrhea.    Respiratory: Negative for chest tightness and shortness of breath.    Gastrointestinal: Negative for diarrhea, nausea and vomiting.   Musculoskeletal: Positive for neck pain and neck stiffness.   Neurological: Positive for headache.       Objective   Physical Exam   Constitutional: She is oriented to person, place, and time. She appears well-developed and well-nourished.   HENT:   Panic membranes are normal.  There is some mild nasal congestion.  Oropharynx is clear   Eyes: Pupils are equal, round, and reactive to light. EOM are normal.   Neck:   She has considerable cervical paraspinous muscle spasm on the left-hand side.  This is tender to palpation.  She has limited range of motion of the neck.   Lymphadenopathy:     She has no cervical adenopathy.   Neurological: She is alert and oriented to person, place, and time. No cranial nerve deficit.   Nursing note and vitals reviewed.        Assessment/Plan    Cayla was seen today for headache.    Diagnoses and all orders for this visit:    Cervical paraspinal muscle spasm  -     methylPREDNISolone acetate (DEPO-medrol) injection 40 mg    Other orders  -     methylPREDNISolone (MEDROL, MARY,) 4 MG tablet; Take as directed on package instructions.  -     tiZANidine (ZANAFLEX) 4 MG tablet; Take 1 tablet by mouth Every 8 (Eight) Hours As Needed for Muscle Spasms (neck pain).      Cayla is here today for neck pain and stiffness.  She appears to have some considerable left-sided cervical paraspinous muscle spasm.  I am going to treat her with a shot of Depo-Medrol today.  She can start a Medrol Dosepak tomorrow.  We will then have her also use some tizanidine today.  If she does not improve then we may consider x-rays or possible physical therapy.

## 2020-01-17 RX ORDER — MELOXICAM 15 MG/1
15 TABLET ORAL DAILY
Qty: 90 TABLET | Refills: 1 | Status: SHIPPED | OUTPATIENT
Start: 2020-01-17 | End: 2021-03-12

## 2020-01-20 RX ORDER — TIZANIDINE 4 MG/1
TABLET ORAL
Qty: 21 TABLET | Refills: 1 | Status: SHIPPED | OUTPATIENT
Start: 2020-01-20 | End: 2020-08-25

## 2020-02-18 RX ORDER — PRAMIPEXOLE DIHYDROCHLORIDE 1.5 MG/1
TABLET ORAL
Qty: 30 TABLET | Refills: 3 | Status: SHIPPED | OUTPATIENT
Start: 2020-02-18 | End: 2020-06-16

## 2020-06-16 RX ORDER — PRAMIPEXOLE DIHYDROCHLORIDE 1.5 MG/1
TABLET ORAL
Qty: 30 TABLET | Refills: 3 | Status: SHIPPED | OUTPATIENT
Start: 2020-06-16 | End: 2020-09-21

## 2020-06-22 ENCOUNTER — OFFICE VISIT (OUTPATIENT)
Dept: FAMILY MEDICINE CLINIC | Facility: CLINIC | Age: 60
End: 2020-06-22

## 2020-06-22 DIAGNOSIS — S80.02XS CONTUSION OF LEFT KNEE, SEQUELA: Primary | ICD-10-CM

## 2020-06-22 DIAGNOSIS — G35 MULTIPLE SCLEROSIS (HCC): ICD-10-CM

## 2020-06-22 DIAGNOSIS — D32.9 MENINGIOMA (HCC): ICD-10-CM

## 2020-06-22 DIAGNOSIS — G25.81 RESTLESS LEG SYNDROME: ICD-10-CM

## 2020-06-22 PROCEDURE — 99214 OFFICE O/P EST MOD 30 MIN: CPT | Performed by: INTERNAL MEDICINE

## 2020-06-22 RX ORDER — GABAPENTIN 300 MG/1
300 CAPSULE ORAL 3 TIMES DAILY
Qty: 90 CAPSULE | Refills: 5 | Status: SHIPPED | OUTPATIENT
Start: 2020-06-22 | End: 2021-10-28

## 2020-06-22 NOTE — PROGRESS NOTES
"Subjective Chief complaint is fall with knee pain but also restless leg syndromeAnswers for HPI/ROS submitted by the patient on 6/22/2020   Lower extremity pain  What is the primary reason for your visit?: Lower Extremity Injury  Incident occurred: more than 1 week ago  Incident location: in the street  Injury mechanism: a fall  Pain location: left knee  Pain - numeric: 4/10  Pain course: fluctuating  loss of motion: Yes  muscle weakness: Yes    Cayla Lopez is a 59 y.o. female.  Recent is here today for complaints of pain in her left leg after a fall.  She was walking and caught her sandal on a uneven edge of the sidewalk.  She fell and landed on the left side of her face.  She did not lose consciousness.  She also landed on her left knee.  She was able to get up and walk.  She has been using some ice.  She does have some pain when she goes from sitting to standing initially but after walking a little bit it seems to get better.  There is an area of numbness in the region of the ecchymoses.  Additionally she is concerned about her restless leg syndrome.  She is on a fairly high dose of Mirapex for this.  She is now experiencing more daytime problems and the symptoms are coming on earlier in the day.  She is on some gabapentin at night which has helped her sleep some.  She does report that it does not actually make her drowsy.  Past history is remarkable for MS and a meningioma.  I would wonder whether some of her additional \"restless leg\" symptoms are actually from her MS.  History of Present Illness     The following portions of the patient's history were reviewed and updated as appropriate: allergies, current medications, past family history, past medical history, past social history, past surgical history and problem list.    Review of Systems   Constitutional: Negative for chills and fever.   HENT: Negative for congestion.    Respiratory: Negative for cough and shortness of breath.    Gastrointestinal: Negative " for diarrhea.   Neurological: Positive for numbness. Negative for dizziness, weakness and headache.       Objective   Physical Exam   Constitutional: She appears well-developed and well-nourished.   Cardiovascular: Normal rate, regular rhythm, normal heart sounds and intact distal pulses.   Pulmonary/Chest: Effort normal and breath sounds normal.   Musculoskeletal: She exhibits no edema.   There is some crepitation of the left knee but no obvious joint effusion.  Most of her bruising is actually on the medial and lateral side of the tibial plateau.   Nursing note and vitals reviewed.        Assessment/Plan   Diagnoses and all orders for this visit:    Contusion of left knee, sequela  -     CBC & Differential    Restless leg syndrome  -     CBC & Differential  -     Iron Profile  -     Ferritin  -     gabapentin (NEURONTIN) 300 MG capsule; Take 1 capsule by mouth 3 (Three) Times a Day.    Multiple sclerosis (CMS/HCC)    Meningioma (CMS/HCC)    Cayla is here today for 2 things.  Her knee seems to be okay.  She does have some osteoarthritic crepitation.  I do not detect a joint effusion in the knee appears to be stable.  She is having worsening leg symptoms which may be restless leg syndrome or may be from her MS.  I am going to try increasing her gabapentin to 3 times daily.

## 2020-07-22 ENCOUNTER — TELEPHONE (OUTPATIENT)
Dept: PAIN MEDICINE | Facility: CLINIC | Age: 60
End: 2020-07-22

## 2020-07-22 NOTE — TELEPHONE ENCOUNTER
Patient sent in a request for a refill on meloxicam. You have not seen patient since 7/16/19. Do you want to refill?

## 2020-08-13 ENCOUNTER — TELEPHONE (OUTPATIENT)
Dept: FAMILY MEDICINE CLINIC | Facility: CLINIC | Age: 60
End: 2020-08-13

## 2020-08-13 NOTE — TELEPHONE ENCOUNTER
LEFT MESSAGE FOR PATIENT TO CALL AND MAKE A LAB APPT FOR OUTSTANDING LABS.    HUB PLEASE ADVISE/READ

## 2020-08-24 ENCOUNTER — TRANSCRIBE ORDERS (OUTPATIENT)
Dept: PREADMISSION TESTING | Facility: HOSPITAL | Age: 60
End: 2020-08-24

## 2020-08-24 DIAGNOSIS — Z01.818 OTHER SPECIFIED PRE-OPERATIVE EXAMINATION: Primary | ICD-10-CM

## 2020-08-25 ENCOUNTER — APPOINTMENT (OUTPATIENT)
Dept: PREADMISSION TESTING | Facility: HOSPITAL | Age: 60
End: 2020-08-25

## 2020-08-25 VITALS
HEART RATE: 72 BPM | RESPIRATION RATE: 16 BRPM | TEMPERATURE: 98.2 F | OXYGEN SATURATION: 98 % | WEIGHT: 240 LBS | SYSTOLIC BLOOD PRESSURE: 107 MMHG | DIASTOLIC BLOOD PRESSURE: 65 MMHG | HEIGHT: 67 IN | BODY MASS INDEX: 37.67 KG/M2

## 2020-08-25 LAB
ALBUMIN SERPL-MCNC: 4.1 G/DL (ref 3.5–5.2)
ALBUMIN/GLOB SERPL: 1.5 G/DL
ALP SERPL-CCNC: 88 U/L (ref 39–117)
ALT SERPL W P-5'-P-CCNC: 16 U/L (ref 1–33)
ANION GAP SERPL CALCULATED.3IONS-SCNC: 6.1 MMOL/L (ref 5–15)
AST SERPL-CCNC: 17 U/L (ref 1–32)
BACTERIA UR QL AUTO: ABNORMAL /HPF
BASOPHILS # BLD AUTO: 0.06 10*3/MM3 (ref 0–0.2)
BASOPHILS NFR BLD AUTO: 0.8 % (ref 0–1.5)
BILIRUB SERPL-MCNC: 0.9 MG/DL (ref 0–1.2)
BILIRUB UR QL STRIP: NEGATIVE
BUN SERPL-MCNC: 21 MG/DL (ref 6–20)
BUN/CREAT SERPL: 24.4 (ref 7–25)
CALCIUM SPEC-SCNC: 9.3 MG/DL (ref 8.6–10.5)
CHLORIDE SERPL-SCNC: 107 MMOL/L (ref 98–107)
CLARITY UR: CLEAR
CO2 SERPL-SCNC: 26.9 MMOL/L (ref 22–29)
COLOR UR: YELLOW
CREAT SERPL-MCNC: 0.86 MG/DL (ref 0.57–1)
DEPRECATED RDW RBC AUTO: 43.3 FL (ref 37–54)
EOSINOPHIL # BLD AUTO: 0.09 10*3/MM3 (ref 0–0.4)
EOSINOPHIL NFR BLD AUTO: 1.2 % (ref 0.3–6.2)
ERYTHROCYTE [DISTWIDTH] IN BLOOD BY AUTOMATED COUNT: 13.1 % (ref 12.3–15.4)
GFR SERPL CREATININE-BSD FRML MDRD: 68 ML/MIN/1.73
GLOBULIN UR ELPH-MCNC: 2.7 GM/DL
GLUCOSE SERPL-MCNC: 98 MG/DL (ref 65–99)
GLUCOSE UR STRIP-MCNC: NEGATIVE MG/DL
HCT VFR BLD AUTO: 40.3 % (ref 34–46.6)
HGB BLD-MCNC: 13 G/DL (ref 12–15.9)
HGB UR QL STRIP.AUTO: NEGATIVE
HYALINE CASTS UR QL AUTO: ABNORMAL /LPF
IMM GRANULOCYTES # BLD AUTO: 0.02 10*3/MM3 (ref 0–0.05)
IMM GRANULOCYTES NFR BLD AUTO: 0.3 % (ref 0–0.5)
KETONES UR QL STRIP: NEGATIVE
LEUKOCYTE ESTERASE UR QL STRIP.AUTO: ABNORMAL
LYMPHOCYTES # BLD AUTO: 2.54 10*3/MM3 (ref 0.7–3.1)
LYMPHOCYTES NFR BLD AUTO: 34.3 % (ref 19.6–45.3)
MCH RBC QN AUTO: 29.1 PG (ref 26.6–33)
MCHC RBC AUTO-ENTMCNC: 32.3 G/DL (ref 31.5–35.7)
MCV RBC AUTO: 90.2 FL (ref 79–97)
MONOCYTES # BLD AUTO: 0.5 10*3/MM3 (ref 0.1–0.9)
MONOCYTES NFR BLD AUTO: 6.7 % (ref 5–12)
NEUTROPHILS NFR BLD AUTO: 4.2 10*3/MM3 (ref 1.7–7)
NEUTROPHILS NFR BLD AUTO: 56.7 % (ref 42.7–76)
NITRITE UR QL STRIP: NEGATIVE
NRBC BLD AUTO-RTO: 0 /100 WBC (ref 0–0.2)
PH UR STRIP.AUTO: <=5 [PH] (ref 5–8)
PLATELET # BLD AUTO: 245 10*3/MM3 (ref 140–450)
PMV BLD AUTO: 10.6 FL (ref 6–12)
POTASSIUM SERPL-SCNC: 3.9 MMOL/L (ref 3.5–5.2)
PROT SERPL-MCNC: 6.8 G/DL (ref 6–8.5)
PROT UR QL STRIP: NEGATIVE
RBC # BLD AUTO: 4.47 10*6/MM3 (ref 3.77–5.28)
RBC # UR: ABNORMAL /HPF
REF LAB TEST METHOD: ABNORMAL
SODIUM SERPL-SCNC: 140 MMOL/L (ref 136–145)
SP GR UR STRIP: 1.01 (ref 1–1.03)
SQUAMOUS #/AREA URNS HPF: ABNORMAL /HPF
UROBILINOGEN UR QL STRIP: ABNORMAL
WBC # BLD AUTO: 7.41 10*3/MM3 (ref 3.4–10.8)
WBC UR QL AUTO: ABNORMAL /HPF

## 2020-08-25 PROCEDURE — 81001 URINALYSIS AUTO W/SCOPE: CPT | Performed by: ORTHOPAEDIC SURGERY

## 2020-08-25 PROCEDURE — 36415 COLL VENOUS BLD VENIPUNCTURE: CPT

## 2020-08-25 PROCEDURE — 93010 ELECTROCARDIOGRAM REPORT: CPT | Performed by: INTERNAL MEDICINE

## 2020-08-25 PROCEDURE — 93005 ELECTROCARDIOGRAM TRACING: CPT

## 2020-08-25 PROCEDURE — 80053 COMPREHEN METABOLIC PANEL: CPT | Performed by: ORTHOPAEDIC SURGERY

## 2020-08-25 PROCEDURE — 85025 COMPLETE CBC W/AUTO DIFF WBC: CPT | Performed by: ORTHOPAEDIC SURGERY

## 2020-08-25 NOTE — DISCHARGE INSTRUCTIONS
Take the following medications the morning of surgery:    GABAPENTIN    If you are on prescription narcotic pain medication to control your pain you may also take that medication the morning of surgery.    General Instructions:  • Do not eat solid food after midnight the night before surgery.  • You may drink clear liquids day of surgery but must stop at least one hour before your hospital arrival time.  • It is beneficial for you to have a clear drink that contains carbohydrates the day of surgery.  We suggest a 12 to 20 ounce bottle of Gatorade or Powerade for non-diabetic patients or a 12 to 20 ounce bottle of G2 or Powerade Zero for diabetic patients.     Clear liquids are liquids you can see through.  Nothing red in color.     Plain water                               Sports drinks  Sodas                                   Gelatin (Jell-O)  Fruit juices without pulp such as white grape juice and apple juice  Popsicles that contain no fruit or yogurt  Tea or coffee (no cream or milk added)  Gatorade / Powerade  G2 / Powerade Zero      • Patients who avoid smoking, chewing tobacco and alcohol for 4 weeks prior to surgery have a reduced risk of post-operative complications.  Quit smoking as many days before surgery as you can.  • Do not smoke, use chewing tobacco or drink alcohol the day of surgery.   • If applicable bring your C-PAP/ BI-PAP machine.  • Bring any papers given to you in the doctor’s office.  • Wear clean comfortable clothes.  • Do not wear contact lenses, false eyelashes or make-up.  Bring a case for your glasses.   • Bring crutches or walker if applicable.  • Remove all piercings.  Leave jewelry and any other valuables at home.  • Hair extensions with metal clips must be removed prior to surgery.  • The Pre-Admission Testing nurse will instruct you to bring medications if unable to obtain an accurate list in Pre-Admission Testing.        Preventing a Surgical Site Infection:  • For 2 to 3 days  before surgery, avoid shaving with a razor because the razor can irritate skin and make it easier to develop an infection.    • Any areas of open skin can increase the risk of a post-operative wound infection by allowing bacteria to enter and travel throughout the body.  Notify your surgeon if you have any skin wounds / rashes even if it is not near the expected surgical site.  The area will need assessed to determine if surgery should be delayed until it is healed.  • The night prior to surgery shower using a fresh bar of anti-bacterial soap (such as Dial) and clean washcloth.  Sleep in a clean bed with clean clothing.  Do not allow pets to sleep with you.  • Shower on the morning of surgery using a fresh bar of anti-bacterial soap (such as Dial) and clean washcloth.  Dry with a clean towel and dress in clean clothing.  • Ask your surgeon if you will be receiving antibiotics prior to surgery.  • Make sure you, your family, and all healthcare providers clean their hands with soap and water or an alcohol based hand  before caring for you or your wound.    Day of surgery:09- YOU WILL BE CALLED WITH AN ARRIVAL TIME TO REPORT TO [facility]   Your arrival time is approximately two hours before your scheduled surgery time.  Upon arrival, a Pre-op nurse and Anesthesiologist will review your health history, obtain vital signs, and answer questions you may have.  The only belongings needed at this time will be a list of your home medications and if applicable your C-PAP/BI-PAP machine.  If you are staying overnight your family can leave the rest of your belongings in the car and bring them to your room later.  A Pre-op nurse will start an IV and you may receive medication in preparation for surgery, including something to help you relax.  Your family will be able to see you in the Pre-op area.  Two visitors at a time will be allowed in the Pre-op room.  While you are in surgery your family should notify the waiting  room  if they leave the waiting room area and provide a contact phone number.    Please be aware that surgery does come with discomfort.  We want to make every effort to control your discomfort so please discuss any uncontrolled symptoms with your nurse.   Your doctor will most likely have prescribed pain medications.      If you are going home after surgery you will receive individualized written care instructions before being discharged.  A responsible adult must drive you to and from the hospital on the day of your surgery and stay with you for 24 hours.    If you are staying overnight following surgery, you will be transported to your hospital room following the recovery period.  [facility] has all private rooms.    If you have any questions please call Pre-Admission Testing at [phone].  Deductibles and co-payments are collected on the day of service. Please be prepared to pay the required co-pay, deductible or deposit on the day of service as defined by your plan.    Patient Education for Self-Quarantine Process  08- Saturday @ 10:10 AM   COVID SCREEN TEST SCHEDULED    Following your COVID testing, we strongly recommend that you do not leave your home after you have been tested for COVID except to get medical care. This includes not going to work, school or to public areas.  If this is not possible for you to do please limit your activities to only required outings.  Be sure to wear a mask when you are with other people, practice social distancing and wash your hands frequently.      The following items provide additional details to keep you safe.  • Wash your hands with soap and water frequently for at least 20 seconds.   • Avoid touching your eyes, nose and mouth with unwashed hands.  • Do not share anything - utensils, towels, food from the same bowl.   • Have your own utensils, drinking glass, dishes, towels and bedding.   • Do not have visitors.   • Do use FaceTime  to stay in touch with family and friends.  • You should stay in a specific room away from others if possible.   • Stay at least 6 feet away from others in the home if you cannot have a dedicated room to yourself.   • Do not snuggle with your pet. While the CDC says there is no evidence that pets can spread COVID-19 or be infected from humans, it is probably best to avoid “petting, snuggling, being kissed or licked and sharing food (during self-quarantine)”, according to the CDC.   • Sanitize household surfaces daily. Include all high touch areas (door handles, light switches, phones, countertops, etc.)  • Do not share a bathroom with others, if possible.   • Wear a mask around others in your home if you are unable to stay in a separate room or 6 feet apart. If  you are unable to wear a mask, have your family member wear a mask if they must be within 6 feet of you.       Call your surgeon immediately if you experience any of the following symptoms:  • Sore Throat  • Shortness of Breath or difficulty breathing  • Cough  • Chills  • Body soreness or muscle pain  • Headache  • Fever  • New loss of taste or smell  • Do not arrive for your surgery ill.  Your procedure will need to be rescheduled to another time.  You will need to call your physician before the day of surgery to avoid any unnecessary exposure to hospital staff as well as other patients.

## 2020-08-29 ENCOUNTER — LAB (OUTPATIENT)
Dept: LAB | Facility: HOSPITAL | Age: 60
End: 2020-08-29

## 2020-08-29 DIAGNOSIS — Z01.818 OTHER SPECIFIED PRE-OPERATIVE EXAMINATION: ICD-10-CM

## 2020-08-29 PROCEDURE — C9803 HOPD COVID-19 SPEC COLLECT: HCPCS

## 2020-08-29 PROCEDURE — U0004 COV-19 TEST NON-CDC HGH THRU: HCPCS

## 2020-08-31 LAB
REF LAB TEST METHOD: NORMAL
SARS-COV-2 RNA RESP QL NAA+PROBE: NOT DETECTED

## 2020-09-01 ENCOUNTER — HOSPITAL ENCOUNTER (OUTPATIENT)
Facility: HOSPITAL | Age: 60
Setting detail: HOSPITAL OUTPATIENT SURGERY
Discharge: HOME | End: 2020-09-01
Attending: ORTHOPAEDIC SURGERY | Admitting: ORTHOPAEDIC SURGERY

## 2020-09-01 ENCOUNTER — ANESTHESIA EVENT (OUTPATIENT)
Dept: PERIOP | Facility: HOSPITAL | Age: 60
End: 2020-09-01

## 2020-09-01 ENCOUNTER — ANESTHESIA (OUTPATIENT)
Dept: PERIOP | Facility: HOSPITAL | Age: 60
End: 2020-09-01

## 2020-09-01 VITALS
DIASTOLIC BLOOD PRESSURE: 82 MMHG | RESPIRATION RATE: 16 BRPM | SYSTOLIC BLOOD PRESSURE: 128 MMHG | OXYGEN SATURATION: 99 % | HEART RATE: 63 BPM | TEMPERATURE: 97 F

## 2020-09-01 PROCEDURE — 25010000002 DEXAMETHASONE PER 1 MG: Performed by: NURSE ANESTHETIST, CERTIFIED REGISTERED

## 2020-09-01 PROCEDURE — 25010000002 ONDANSETRON PER 1 MG: Performed by: NURSE ANESTHETIST, CERTIFIED REGISTERED

## 2020-09-01 PROCEDURE — 25010000002 FENTANYL CITRATE (PF) 100 MCG/2ML SOLUTION: Performed by: NURSE ANESTHETIST, CERTIFIED REGISTERED

## 2020-09-01 PROCEDURE — 25010000002 PROPOFOL 10 MG/ML EMULSION: Performed by: NURSE ANESTHETIST, CERTIFIED REGISTERED

## 2020-09-01 PROCEDURE — 25010000002 HYDROMORPHONE PER 4 MG: Performed by: ANESTHESIOLOGY

## 2020-09-01 PROCEDURE — 25010000002 MIDAZOLAM PER 1 MG: Performed by: ANESTHESIOLOGY

## 2020-09-01 PROCEDURE — 25010000002 EPINEPHRINE PER 0.1 MG: Performed by: ORTHOPAEDIC SURGERY

## 2020-09-01 PROCEDURE — 25010000003 CEFAZOLIN IN DEXTROSE 2-4 GM/100ML-% SOLUTION: Performed by: ORTHOPAEDIC SURGERY

## 2020-09-01 RX ORDER — FENTANYL CITRATE 50 UG/ML
100 INJECTION, SOLUTION INTRAMUSCULAR; INTRAVENOUS
Status: DISCONTINUED | OUTPATIENT
Start: 2020-09-01 | End: 2020-09-01 | Stop reason: HOSPADM

## 2020-09-01 RX ORDER — LIDOCAINE HYDROCHLORIDE 20 MG/ML
INJECTION, SOLUTION INFILTRATION; PERINEURAL AS NEEDED
Status: DISCONTINUED | OUTPATIENT
Start: 2020-09-01 | End: 2020-09-01 | Stop reason: SURG

## 2020-09-01 RX ORDER — FAMOTIDINE 10 MG/ML
20 INJECTION, SOLUTION INTRAVENOUS ONCE
Status: COMPLETED | OUTPATIENT
Start: 2020-09-01 | End: 2020-09-01

## 2020-09-01 RX ORDER — CEFAZOLIN SODIUM 2 G/100ML
2 INJECTION, SOLUTION INTRAVENOUS ONCE
Status: COMPLETED | OUTPATIENT
Start: 2020-09-01 | End: 2020-09-01

## 2020-09-01 RX ORDER — ONDANSETRON 2 MG/ML
INJECTION INTRAMUSCULAR; INTRAVENOUS AS NEEDED
Status: DISCONTINUED | OUTPATIENT
Start: 2020-09-01 | End: 2020-09-01 | Stop reason: SURG

## 2020-09-01 RX ORDER — FLUMAZENIL 0.1 MG/ML
0.2 INJECTION INTRAVENOUS AS NEEDED
Status: DISCONTINUED | OUTPATIENT
Start: 2020-09-01 | End: 2020-09-01 | Stop reason: HOSPADM

## 2020-09-01 RX ORDER — DEXAMETHASONE SODIUM PHOSPHATE 4 MG/ML
INJECTION, SOLUTION INTRA-ARTICULAR; INTRALESIONAL; INTRAMUSCULAR; INTRAVENOUS; SOFT TISSUE AS NEEDED
Status: DISCONTINUED | OUTPATIENT
Start: 2020-09-01 | End: 2020-09-01 | Stop reason: SURG

## 2020-09-01 RX ORDER — LIDOCAINE HYDROCHLORIDE 10 MG/ML
0.5 INJECTION, SOLUTION EPIDURAL; INFILTRATION; INTRACAUDAL; PERINEURAL ONCE AS NEEDED
Status: DISCONTINUED | OUTPATIENT
Start: 2020-09-01 | End: 2020-09-01 | Stop reason: HOSPADM

## 2020-09-01 RX ORDER — FENTANYL CITRATE 50 UG/ML
50 INJECTION, SOLUTION INTRAMUSCULAR; INTRAVENOUS
Status: DISCONTINUED | OUTPATIENT
Start: 2020-09-01 | End: 2020-09-01 | Stop reason: HOSPADM

## 2020-09-01 RX ORDER — BUPIVACAINE HYDROCHLORIDE AND EPINEPHRINE 5; 5 MG/ML; UG/ML
INJECTION, SOLUTION PERINEURAL AS NEEDED
Status: DISCONTINUED | OUTPATIENT
Start: 2020-09-01 | End: 2020-09-01 | Stop reason: HOSPADM

## 2020-09-01 RX ORDER — FENTANYL CITRATE 50 UG/ML
INJECTION, SOLUTION INTRAMUSCULAR; INTRAVENOUS AS NEEDED
Status: DISCONTINUED | OUTPATIENT
Start: 2020-09-01 | End: 2020-09-01 | Stop reason: SURG

## 2020-09-01 RX ORDER — MIDAZOLAM HYDROCHLORIDE 1 MG/ML
2 INJECTION INTRAMUSCULAR; INTRAVENOUS
Status: DISCONTINUED | OUTPATIENT
Start: 2020-09-01 | End: 2020-09-01 | Stop reason: HOSPADM

## 2020-09-01 RX ORDER — SODIUM CHLORIDE 0.9 % (FLUSH) 0.9 %
3 SYRINGE (ML) INJECTION EVERY 12 HOURS SCHEDULED
Status: DISCONTINUED | OUTPATIENT
Start: 2020-09-01 | End: 2020-09-01 | Stop reason: HOSPADM

## 2020-09-01 RX ORDER — HYDROCODONE BITARTRATE AND ACETAMINOPHEN 7.5; 325 MG/1; MG/1
1 TABLET ORAL ONCE AS NEEDED
Status: DISCONTINUED | OUTPATIENT
Start: 2020-09-01 | End: 2020-09-01 | Stop reason: HOSPADM

## 2020-09-01 RX ORDER — HYDRALAZINE HYDROCHLORIDE 20 MG/ML
5 INJECTION INTRAMUSCULAR; INTRAVENOUS
Status: DISCONTINUED | OUTPATIENT
Start: 2020-09-01 | End: 2020-09-01 | Stop reason: HOSPADM

## 2020-09-01 RX ORDER — PROPOFOL 10 MG/ML
VIAL (ML) INTRAVENOUS AS NEEDED
Status: DISCONTINUED | OUTPATIENT
Start: 2020-09-01 | End: 2020-09-01 | Stop reason: SURG

## 2020-09-01 RX ORDER — SODIUM CHLORIDE, SODIUM LACTATE, POTASSIUM CHLORIDE, CALCIUM CHLORIDE 600; 310; 30; 20 MG/100ML; MG/100ML; MG/100ML; MG/100ML
9 INJECTION, SOLUTION INTRAVENOUS CONTINUOUS
Status: DISCONTINUED | OUTPATIENT
Start: 2020-09-01 | End: 2020-09-01 | Stop reason: HOSPADM

## 2020-09-01 RX ORDER — HYDROMORPHONE HYDROCHLORIDE 1 MG/ML
0.5 INJECTION, SOLUTION INTRAMUSCULAR; INTRAVENOUS; SUBCUTANEOUS
Status: DISCONTINUED | OUTPATIENT
Start: 2020-09-01 | End: 2020-09-01 | Stop reason: HOSPADM

## 2020-09-01 RX ORDER — OXYCODONE AND ACETAMINOPHEN 7.5; 325 MG/1; MG/1
1 TABLET ORAL ONCE AS NEEDED
Status: COMPLETED | OUTPATIENT
Start: 2020-09-01 | End: 2020-09-01

## 2020-09-01 RX ORDER — ONDANSETRON 2 MG/ML
4 INJECTION INTRAMUSCULAR; INTRAVENOUS ONCE AS NEEDED
Status: DISCONTINUED | OUTPATIENT
Start: 2020-09-01 | End: 2020-09-01 | Stop reason: HOSPADM

## 2020-09-01 RX ORDER — SODIUM CHLORIDE 0.9 % (FLUSH) 0.9 %
3-10 SYRINGE (ML) INJECTION AS NEEDED
Status: DISCONTINUED | OUTPATIENT
Start: 2020-09-01 | End: 2020-09-01 | Stop reason: HOSPADM

## 2020-09-01 RX ORDER — EPHEDRINE SULFATE 50 MG/ML
5 INJECTION, SOLUTION INTRAVENOUS ONCE AS NEEDED
Status: DISCONTINUED | OUTPATIENT
Start: 2020-09-01 | End: 2020-09-01 | Stop reason: HOSPADM

## 2020-09-01 RX ADMIN — ONDANSETRON HYDROCHLORIDE 4 MG: 2 SOLUTION INTRAMUSCULAR; INTRAVENOUS at 11:00

## 2020-09-01 RX ADMIN — CEFAZOLIN SODIUM 2 G: 2 INJECTION, SOLUTION INTRAVENOUS at 10:55

## 2020-09-01 RX ADMIN — PROPOFOL 200 MG: 10 INJECTION, EMULSION INTRAVENOUS at 10:52

## 2020-09-01 RX ADMIN — FENTANYL CITRATE 50 MCG: 50 INJECTION INTRAMUSCULAR; INTRAVENOUS at 10:52

## 2020-09-01 RX ADMIN — MIDAZOLAM 2 MG: 1 INJECTION INTRAMUSCULAR; INTRAVENOUS at 09:38

## 2020-09-01 RX ADMIN — FAMOTIDINE 20 MG: 10 INJECTION INTRAVENOUS at 09:38

## 2020-09-01 RX ADMIN — OXYCODONE HYDROCHLORIDE AND ACETAMINOPHEN 1 TABLET: 7.5; 325 TABLET ORAL at 11:53

## 2020-09-01 RX ADMIN — HYDROMORPHONE HYDROCHLORIDE 0.5 MG: 1 INJECTION, SOLUTION INTRAMUSCULAR; INTRAVENOUS; SUBCUTANEOUS at 12:19

## 2020-09-01 RX ADMIN — DEXAMETHASONE SODIUM PHOSPHATE 8 MG: 4 INJECTION INTRA-ARTICULAR; INTRALESIONAL; INTRAMUSCULAR; INTRAVENOUS; SOFT TISSUE at 11:00

## 2020-09-01 RX ADMIN — LIDOCAINE HYDROCHLORIDE 80 MG: 20 INJECTION, SOLUTION INFILTRATION; PERINEURAL at 10:52

## 2020-09-01 RX ADMIN — SODIUM CHLORIDE, POTASSIUM CHLORIDE, SODIUM LACTATE AND CALCIUM CHLORIDE 9 ML/HR: 600; 310; 30; 20 INJECTION, SOLUTION INTRAVENOUS at 09:30

## 2020-09-01 RX ADMIN — FENTANYL CITRATE 50 MCG: 50 INJECTION INTRAMUSCULAR; INTRAVENOUS at 11:04

## 2020-09-01 RX ADMIN — HYDROMORPHONE HYDROCHLORIDE 0.5 MG: 1 INJECTION, SOLUTION INTRAMUSCULAR; INTRAVENOUS; SUBCUTANEOUS at 11:44

## 2020-09-01 NOTE — ANESTHESIA PREPROCEDURE EVALUATION
Anesthesia Evaluation     Patient summary reviewed and Nursing notes reviewed   NPO Solid Status: > 8 hours             Airway   Mallampati: II  TM distance: >3 FB  Neck ROM: full  no difficulty expected  Dental - normal exam     Pulmonary - negative pulmonary ROS and normal exam   Cardiovascular - negative cardio ROS and normal exam        Neuro/Psych- negative ROS  GI/Hepatic/Renal/Endo    (+) morbid obesity,      Musculoskeletal (-) negative ROS    Abdominal  - normal exam   Substance History - negative use     OB/GYN negative ob/gyn ROS         Other        ROS/Med Hx Other: MS                  Anesthesia Plan    ASA 3     general     intravenous induction     Anesthetic plan, all risks, benefits, and alternatives have been provided, discussed and informed consent has been obtained with: patient.    Plan discussed with CRNA.

## 2020-09-01 NOTE — BRIEF OP NOTE
KNEE ARTHROSCOPY  Progress Note    Cayla Lopez  9/1/2020    Pre-op Diagnosis:   Lt mmt, djd       Post-Op Diagnosis Codes:   pmm    Procedure/CPT® Codes:        Procedure(s):  LEFT KNEE ARTHROSCOPY WITH PARTIAL MEDIAL MENISECTOMY    Surgeon(s):  Braden Morgan MD    Anesthesia: General    Staff:   Circulator: Negar Alexis RN  Scrub Person: Kathi Cheema  Assistant: Kitty Vera APRN  Assistant: Kitty Vera APRN      Estimated Blood Loss: 20 mL    Urine Voided: 0 mL    Specimens:                None          Drains: * No LDAs found *    Findings: above    Complications: none known    Assistant: Kitty Vera APRN  was responsible for performing the following activities: Retraction and their skilled assistance was necessary for the success of this case.    MICHELLE Morgan MD     Date: 9/1/2020  Time: 11:19

## 2020-09-01 NOTE — ANESTHESIA POSTPROCEDURE EVALUATION
Patient: Cayla Lopez    Procedure Summary     Date:  09/01/20 Room / Location:  [facility]  /  [facility]    Anesthesia Start:  1049 Anesthesia Stop:  1129    Procedure:  LEFT KNEE ARTHROSCOPY WITH PARTIAL MEDIAL MENISECTOMY (Left Knee) Diagnosis:      Surgeon:  Braden Morgan MD Provider:  Johnnie Lay MD    Anesthesia Type:  general ASA Status:  3          Anesthesia Type: general    Vitals  Vitals Value Taken Time   /83 9/1/2020 12:30 PM   Temp 36.1 °C (97 °F) 9/1/2020 11:25 AM   Pulse 62 9/1/2020 12:43 PM   Resp 16 9/1/2020 12:30 PM   SpO2 97 % 9/1/2020 12:43 PM   Vitals shown include unvalidated device data.        Post Anesthesia Care and Evaluation    Patient location during evaluation: bedside  Patient participation: complete - patient participated  Level of consciousness: awake  Pain score: 1  Pain management: adequate  Airway patency: patent  Anesthetic complications: No anesthetic complications  PONV Status: controlled  Cardiovascular status: acceptable  Respiratory status: acceptable  Hydration status: acceptable    Comments: --------------------            09/01/20               1230     --------------------   BP:       149/83     Pulse:      59       Resp:       16       Temp:                SpO2:      98%      --------------------

## 2020-09-21 RX ORDER — PRAMIPEXOLE DIHYDROCHLORIDE 1.5 MG/1
1.5 TABLET ORAL NIGHTLY
Qty: 90 TABLET | Refills: 1 | Status: SHIPPED | OUTPATIENT
Start: 2020-09-21 | End: 2021-04-12

## 2020-10-13 ENCOUNTER — TELEPHONE (OUTPATIENT)
Dept: FAMILY MEDICINE CLINIC | Facility: CLINIC | Age: 60
End: 2020-10-13

## 2020-10-13 NOTE — TELEPHONE ENCOUNTER
Patient called in stating she had knee surgery on Sept. 1st and she's having some issues with swelling in her legs.   The Physical Therapist thought she should bee seen sooner than her appointment on Friday.    Please call to advise    Best call back # [phone]

## 2020-10-13 NOTE — TELEPHONE ENCOUNTER
CALLED PATIENT AND ADVISED IF SHE IS HAVING SOME SWELLING IN HER LEGS SHE SHOULD FOLLOW UP WITH HER SURGEON SINCE SHE JUST HAD SURGERY. PT UNDERSTOOD AND WILL REACH OUT TO THEM.

## 2021-03-12 ENCOUNTER — OFFICE VISIT (OUTPATIENT)
Dept: FAMILY MEDICINE CLINIC | Facility: CLINIC | Age: 61
End: 2021-03-12

## 2021-03-12 VITALS
WEIGHT: 253 LBS | DIASTOLIC BLOOD PRESSURE: 84 MMHG | BODY MASS INDEX: 39.71 KG/M2 | TEMPERATURE: 97.1 F | HEART RATE: 79 BPM | OXYGEN SATURATION: 99 % | SYSTOLIC BLOOD PRESSURE: 132 MMHG | HEIGHT: 67 IN

## 2021-03-12 DIAGNOSIS — M79.89 LEG SWELLING: Primary | ICD-10-CM

## 2021-03-12 DIAGNOSIS — G47.00 INSOMNIA, UNSPECIFIED TYPE: ICD-10-CM

## 2021-03-12 PROCEDURE — 99214 OFFICE O/P EST MOD 30 MIN: CPT | Performed by: INTERNAL MEDICINE

## 2021-03-12 RX ORDER — HYDROXYZINE HYDROCHLORIDE 25 MG/1
25 TABLET, FILM COATED ORAL NIGHTLY
Qty: 30 TABLET | Refills: 1 | Status: SHIPPED | OUTPATIENT
Start: 2021-03-12 | End: 2021-10-28

## 2021-03-12 RX ORDER — FUROSEMIDE 40 MG/1
40 TABLET ORAL DAILY
Qty: 30 TABLET | Refills: 1 | Status: SHIPPED | OUTPATIENT
Start: 2021-03-12 | End: 2021-04-15

## 2021-03-12 RX ORDER — POTASSIUM CHLORIDE 20 MEQ/1
20 TABLET, EXTENDED RELEASE ORAL DAILY
Qty: 30 TABLET | Refills: 1 | Status: SHIPPED | OUTPATIENT
Start: 2021-03-12 | End: 2021-04-15

## 2021-03-12 NOTE — PROGRESS NOTES
Answers for HPI/ROS submitted by the patient on 3/5/2021  Please describe your symptoms.: Swelling in ankles (predominately left), restless leg is worse, do not sleep on avg 4-5 hrs  and would like referral to sleep center.  Have you had these symptoms before?: Yes  How long have you been having these symptoms?: Greater than 2 weeks  Please list any medications you are currently taking for this condition.: pramapaxole for restless leg  Please describe any probable cause for these symptoms. : had swelling prior to arthroscopic knee surgery, but since surgery it has become worse,  What is the primary reason for your visit?: Other    Subjective Chief complaint is swelling in the legs and ankles but also insomnia  Cayla Lopez is a 60 y.o. female.     History of Present Illness Cayla is here today for swelling in her legs and ankles.  This been going on for several weeks.  There is some associated redness of the skin.  She does not report any increased salt in the diet.  Her weight however is increased by about 13 pounds since her last visit.  She does spend a lot of time sitting at work.  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  She does report that she falls asleep okay.  She will usually be asleep for an hour and a half and then she wakes up.  She usually then has trouble falling back asleep.  She figures she only gets 6 hours total of sleep per night.  Some of this is due to restless leg symptoms which seem to be starting earlier and earlier in the day.  She is already on a pretty high dose of Mirapex and also taking gabapentin.  We did discuss she may have to see a neurologist for this.    The following portions of the patient's history were reviewed and updated as appropriate: allergies, current medications, past family history, past medical history, past social history, past surgical history and problem list.    Review of Systems   Constitutional: Negative for chills and fever.   Respiratory: Positive for shortness  of breath. Negative for chest tightness.    Cardiovascular: Positive for leg swelling. Negative for chest pain.   Skin: Positive for color change.       Objective   Physical Exam  Vitals and nursing note reviewed.   Constitutional:       Appearance: Normal appearance.   Neck:      Vascular: No carotid bruit or JVD.   Cardiovascular:      Rate and Rhythm: Normal rate and regular rhythm.      Pulses: Normal pulses.   Pulmonary:      Effort: Pulmonary effort is normal.      Breath sounds: No wheezing, rhonchi or rales.   Musculoskeletal:      Right lower leg: Edema present.      Left lower leg: Edema present.      Comments: She does have bilateral lower extremity pitting edema but it is not as pitted as I would think from her weight gain.  She also has some stasis dermatitis skin changes.  I do not think this looks like cellulitis.   Neurological:      Mental Status: She is alert.           Assessment/Plan   Diagnoses and all orders for this visit:    1. Leg swelling (Primary)    2. Insomnia, unspecified type    Other orders  -     furosemide (Lasix) 40 MG tablet; Take 1 tablet by mouth Daily.  Dispense: 30 tablet; Refill: 1  -     potassium chloride (K-DUR,KLOR-CON) 20 MEQ CR tablet; Take 1 tablet by mouth Daily.  Dispense: 30 tablet; Refill: 1  -     hydrOXYzine (ATARAX) 25 MG tablet; Take 1 tablet by mouth Every Night.  Dispense: 30 tablet; Refill: 1      Cayla is here today for bilateral leg swelling.  I suspect this is going to be some stasis edema and stasis dermatitis.  We are going to put her on some furosemide and potassium.  I did advise her to make sure she gets up and walks around at times during the day.  Also advised her to elevate her feet at or above the level of the heart when she can.  I am going to see her back in 2 weeks.  For her insomnia I am going to have her take some Atarax and use some melatonin.

## 2021-03-26 ENCOUNTER — BULK ORDERING (OUTPATIENT)
Dept: CASE MANAGEMENT | Facility: OTHER | Age: 61
End: 2021-03-26

## 2021-03-26 DIAGNOSIS — Z23 IMMUNIZATION DUE: ICD-10-CM

## 2021-04-12 RX ORDER — PRAMIPEXOLE DIHYDROCHLORIDE 1.5 MG/1
1.5 TABLET ORAL NIGHTLY
Qty: 90 TABLET | Refills: 1 | Status: SHIPPED | OUTPATIENT
Start: 2021-04-12 | End: 2021-10-08

## 2021-04-15 RX ORDER — FUROSEMIDE 40 MG/1
40 TABLET ORAL DAILY
Qty: 90 TABLET | Refills: 1 | Status: SHIPPED | OUTPATIENT
Start: 2021-04-15 | End: 2021-10-28

## 2021-04-15 RX ORDER — POTASSIUM CHLORIDE 20 MEQ/1
20 TABLET, EXTENDED RELEASE ORAL DAILY
Qty: 90 TABLET | Refills: 1 | Status: SHIPPED | OUTPATIENT
Start: 2021-04-15 | End: 2021-10-28

## 2021-10-08 RX ORDER — PRAMIPEXOLE DIHYDROCHLORIDE 1.5 MG/1
1.5 TABLET ORAL NIGHTLY
Qty: 90 TABLET | Refills: 1 | Status: SHIPPED | OUTPATIENT
Start: 2021-10-08 | End: 2021-10-28 | Stop reason: ALTCHOICE

## 2021-10-08 NOTE — TELEPHONE ENCOUNTER
Rx Refill Note  Requested Prescriptions     Pending Prescriptions Disp Refills   • pramipexole (MIRAPEX) 1.5 MG tablet [Pharmacy Med Name: PRAMIPEXOLE 1.5MG TABLETS] 90 tablet 1     Sig: TAKE 1 TABLET BY MOUTH EVERY NIGHT      Last office visit with prescribing clinician: 3/12/2021      Next office visit with prescribing clinician: Visit date not found            Jaron Morgan MA  10/08/21, 07:07 EDT

## 2021-10-28 ENCOUNTER — OFFICE VISIT (OUTPATIENT)
Dept: FAMILY MEDICINE CLINIC | Facility: CLINIC | Age: 61
End: 2021-10-28

## 2021-10-28 VITALS
HEIGHT: 67 IN | DIASTOLIC BLOOD PRESSURE: 78 MMHG | SYSTOLIC BLOOD PRESSURE: 124 MMHG | OXYGEN SATURATION: 98 % | HEART RATE: 82 BPM | WEIGHT: 240 LBS | BODY MASS INDEX: 37.67 KG/M2

## 2021-10-28 DIAGNOSIS — F51.01 PRIMARY INSOMNIA: ICD-10-CM

## 2021-10-28 DIAGNOSIS — G25.81 RESTLESS LEG SYNDROME: Primary | ICD-10-CM

## 2021-10-28 PROCEDURE — 99214 OFFICE O/P EST MOD 30 MIN: CPT | Performed by: INTERNAL MEDICINE

## 2021-10-28 RX ORDER — TRAZODONE HYDROCHLORIDE 50 MG/1
50 TABLET ORAL NIGHTLY
Qty: 30 TABLET | Refills: 5 | Status: SHIPPED | OUTPATIENT
Start: 2021-10-28 | End: 2022-01-11

## 2021-10-28 RX ORDER — ROPINIROLE 2 MG/1
2 TABLET, FILM COATED ORAL NIGHTLY
Qty: 30 TABLET | Refills: 5 | Status: SHIPPED | OUTPATIENT
Start: 2021-10-28 | End: 2021-11-24 | Stop reason: SDUPTHER

## 2021-10-28 NOTE — PROGRESS NOTES
Subjective Chief complaint is restless leg syndrome  Cayla Lopez is a 60 y.o. female.     History of Present Illness Cayla is here today for follow-up on her restless leg syndrome.  This seems to be getting worse despite a maximal dose of Mirapex.  She is on 1.5 mg at night.  She reports that she is still waking up numerous times in the night and is also now having symptoms worse in the daytime if she is sitting.  She had some iron test done in 2019 we have not rechecked them since then.  Additionally she reports that she falls asleep okay but then cannot stay asleep.  Some of this is because she has to get up and walk around because of the restless leg symptoms.  I cannot find that she was ever tried on Requip.  She was previously on gabapentin and we did discuss possibly going back to that in addition to the Requip for daytime symptoms.    The following portions of the patient's history were reviewed and updated as appropriate: allergies, current medications, past family history, past medical history, past social history, past surgical history and problem list.    Review of Systems   Constitutional: Negative for chills and fever.   Respiratory: Negative for cough.    Psychiatric/Behavioral: Positive for sleep disturbance.       Objective   Physical Exam  Constitutional:       Appearance: Normal appearance.   Cardiovascular:      Rate and Rhythm: Normal rate and regular rhythm.   Pulmonary:      Effort: Pulmonary effort is normal.      Breath sounds: No wheezing, rhonchi or rales.   Abdominal:      General: Abdomen is flat. Bowel sounds are normal.      Tenderness: There is no abdominal tenderness. There is no guarding or rebound.   Musculoskeletal:      Comments: She has some mild more doughy edema rather than pitting edema of the legs.   Neurological:      Mental Status: She is alert.           Assessment/Plan   Diagnoses and all orders for this visit:    1. Restless leg syndrome (Primary)  -     CBC &  Differential  -     Ferritin  -     Iron Profile  -     Vitamin B12  -     Folate  -     TSH+Free T4    2. Primary insomnia    Other orders  -     rOPINIRole (Requip) 2 MG tablet; Take 1 tablet by mouth Every Night. Take 1 hour before bedtime.  Dispense: 30 tablet; Refill: 5  -     traZODone (DESYREL) 50 MG tablet; Take 1 tablet by mouth Every Night.  Dispense: 30 tablet; Refill: 5      Jaimie is here today for her restless leg syndrome.  I am going to switch her over to Requip.  We will try 2 mg at night initially.  We are going to have her stop the Mirapex.  I am going to add some trazodone in for her insomnia.  We did discuss possibly going back to gabapentin.  I am going to see her back in 3 to 4 weeks

## 2021-10-29 LAB
BASOPHILS # BLD AUTO: 0.1 X10E3/UL (ref 0–0.2)
BASOPHILS NFR BLD AUTO: 1 %
EOSINOPHIL # BLD AUTO: 0.1 X10E3/UL (ref 0–0.4)
EOSINOPHIL NFR BLD AUTO: 2 %
ERYTHROCYTE [DISTWIDTH] IN BLOOD BY AUTOMATED COUNT: 12.5 % (ref 11.7–15.4)
FERRITIN SERPL-MCNC: 69 NG/ML (ref 15–150)
FOLATE SERPL-MCNC: 11.5 NG/ML
HCT VFR BLD AUTO: 38.9 % (ref 34–46.6)
HGB BLD-MCNC: 13.1 G/DL (ref 11.1–15.9)
IMM GRANULOCYTES # BLD AUTO: 0 X10E3/UL (ref 0–0.1)
IMM GRANULOCYTES NFR BLD AUTO: 0 %
IRON SATN MFR SERPL: 30 % (ref 15–55)
IRON SERPL-MCNC: 82 UG/DL (ref 27–159)
LYMPHOCYTES # BLD AUTO: 2.6 X10E3/UL (ref 0.7–3.1)
LYMPHOCYTES NFR BLD AUTO: 39 %
MCH RBC QN AUTO: 29.3 PG (ref 26.6–33)
MCHC RBC AUTO-ENTMCNC: 33.7 G/DL (ref 31.5–35.7)
MCV RBC AUTO: 87 FL (ref 79–97)
MONOCYTES # BLD AUTO: 0.5 X10E3/UL (ref 0.1–0.9)
MONOCYTES NFR BLD AUTO: 8 %
NEUTROPHILS # BLD AUTO: 3.5 X10E3/UL (ref 1.4–7)
NEUTROPHILS NFR BLD AUTO: 50 %
PLATELET # BLD AUTO: 237 X10E3/UL (ref 150–450)
RBC # BLD AUTO: 4.47 X10E6/UL (ref 3.77–5.28)
T4 FREE SERPL-MCNC: 1.12 NG/DL (ref 0.82–1.77)
TIBC SERPL-MCNC: 275 UG/DL (ref 250–450)
TSH SERPL DL<=0.005 MIU/L-ACNC: 2.25 UIU/ML (ref 0.45–4.5)
UIBC SERPL-MCNC: 193 UG/DL (ref 131–425)
VIT B12 SERPL-MCNC: 486 PG/ML (ref 232–1245)
WBC # BLD AUTO: 6.8 X10E3/UL (ref 3.4–10.8)

## 2021-11-24 ENCOUNTER — OFFICE VISIT (OUTPATIENT)
Dept: FAMILY MEDICINE CLINIC | Facility: CLINIC | Age: 61
End: 2021-11-24

## 2021-11-24 VITALS
DIASTOLIC BLOOD PRESSURE: 72 MMHG | HEART RATE: 92 BPM | SYSTOLIC BLOOD PRESSURE: 120 MMHG | HEIGHT: 67 IN | WEIGHT: 256 LBS | OXYGEN SATURATION: 99 % | BODY MASS INDEX: 40.18 KG/M2

## 2021-11-24 DIAGNOSIS — G25.81 RESTLESS LEG SYNDROME: ICD-10-CM

## 2021-11-24 DIAGNOSIS — M79.18 MYOFASCIAL PAIN: ICD-10-CM

## 2021-11-24 DIAGNOSIS — R40.0 DAYTIME SOMNOLENCE: ICD-10-CM

## 2021-11-24 DIAGNOSIS — G47.00 INSOMNIA, UNSPECIFIED TYPE: Primary | ICD-10-CM

## 2021-11-24 DIAGNOSIS — G35 MULTIPLE SCLEROSIS (HCC): ICD-10-CM

## 2021-11-24 PROCEDURE — 99214 OFFICE O/P EST MOD 30 MIN: CPT | Performed by: INTERNAL MEDICINE

## 2021-11-24 RX ORDER — GABAPENTIN 300 MG/1
300 CAPSULE ORAL 3 TIMES DAILY
Qty: 90 CAPSULE | Refills: 5 | Status: SHIPPED | OUTPATIENT
Start: 2021-11-24 | End: 2022-01-11

## 2021-11-24 RX ORDER — ROPINIROLE 4 MG/1
4 TABLET, FILM COATED ORAL NIGHTLY
Qty: 30 TABLET | Refills: 5 | Status: SHIPPED | OUTPATIENT
Start: 2021-11-24 | End: 2022-01-11

## 2021-11-24 NOTE — PROGRESS NOTES
Subjective Chief complaint is follow-up on restless leg syndrome  Cayla Lopez is a 60 y.o. female.     History of Present Illness rad is here today for follow-up.  Since her last visit we did stop her Mirapex and put her on some Requip at 2 mg nightly.  She seems to think things may be a little bit better but not appreciably.  She still reports that she is awake most of the night and only sleeping 2 to 3 hours.  She also reports that she is having worsening pain.  This is in her neck and arms and legs and back.  She reports that she is dozing off frequently during the day.  She does have multiple sclerosis.  Has been somewhat reluctant to see neurologist and do anything about it.  However she does have an appointment with the neurologist but is not until February.    The following portions of the patient's history were reviewed and updated as appropriate: allergies, current medications, past family history, past medical history, past social history, past surgical history and problem list.    Review of Systems   Constitutional: Negative for chills and fever.   Musculoskeletal: Positive for arthralgias, back pain, myalgias, neck pain and neck stiffness.   Psychiatric/Behavioral: Positive for sleep disturbance and depressed mood.       Objective   Physical Exam  Vitals and nursing note reviewed.   Constitutional:       Appearance: She is obese.   Cardiovascular:      Rate and Rhythm: Normal rate and regular rhythm.   Pulmonary:      Effort: Pulmonary effort is normal.      Breath sounds: No wheezing, rhonchi or rales.   Musculoskeletal:      Comments: Her range of motion seems to be okay.  She is really not having much in the way of tender trigger points for fibromyalgia.   Neurological:      Mental Status: She is alert.           Assessment/Plan   Diagnoses and all orders for this visit:    1. Insomnia, unspecified type (Primary)  -     Ambulatory Referral to Sleep Medicine    2. Daytime somnolence  -      Ambulatory Referral to Sleep Medicine    3. Restless leg syndrome  -     Ambulatory Referral to Sleep Medicine    4. Myofascial pain  -     gabapentin (NEURONTIN) 300 MG capsule; Take 1 capsule by mouth 3 (Three) Times a Day.  Dispense: 90 capsule; Refill: 5    5. Multiple sclerosis (HCC)    Other orders  -     rOPINIRole (Requip) 4 MG tablet; Take 1 tablet by mouth Every Night. Take 1 hour before bedtime.  Dispense: 30 tablet; Refill: 5    Jaimie is here today for follow-up.  Her restless leg syndrome is not doing as well as she would like.  We are going to increase her Requip.  We are also going to put her back on some gabapentin.  She is going to phase this in.  She is going to start it at night for several days then go to twice a day for several days and then increase to three times daily.  We are going to refer her for a sleep evaluation.  However she may end up getting benefit from a sleep psychologist.  We will discuss that at her next visit if she is doing no better.

## 2022-01-10 RX ORDER — PRAMIPEXOLE DIHYDROCHLORIDE 1.5 MG/1
1.5 TABLET ORAL NIGHTLY
Qty: 90 TABLET | Refills: 1 | OUTPATIENT
Start: 2022-01-10

## 2022-01-10 NOTE — TELEPHONE ENCOUNTER
Rx Refill Note  Requested Prescriptions     Pending Prescriptions Disp Refills   • pramipexole (MIRAPEX) 1.5 MG tablet [Pharmacy Med Name: PRAMIPEXOLE 1.5MG TABLETS] 90 tablet 1     Sig: TAKE 1 TABLET BY MOUTH EVERY NIGHT      Last office visit with prescribing clinician: 11/24/2021      Next office visit with prescribing clinician: Visit date not found            Jaron Morgan MA  01/10/22, 07:05 EST

## 2022-01-11 ENCOUNTER — OFFICE VISIT (OUTPATIENT)
Dept: SLEEP MEDICINE | Facility: HOSPITAL | Age: 62
End: 2022-01-11

## 2022-01-11 VITALS
BODY MASS INDEX: 38.45 KG/M2 | SYSTOLIC BLOOD PRESSURE: 147 MMHG | WEIGHT: 245 LBS | OXYGEN SATURATION: 99 % | DIASTOLIC BLOOD PRESSURE: 67 MMHG | HEIGHT: 67 IN | HEART RATE: 75 BPM

## 2022-01-11 DIAGNOSIS — G25.81 RESTLESS LEGS SYNDROME (RLS): Primary | ICD-10-CM

## 2022-01-11 DIAGNOSIS — E66.9 OBESITY WITH SERIOUS COMORBIDITY, UNSPECIFIED CLASSIFICATION, UNSPECIFIED OBESITY TYPE: ICD-10-CM

## 2022-01-11 DIAGNOSIS — G47.8 NON-RESTORATIVE SLEEP: ICD-10-CM

## 2022-01-11 DIAGNOSIS — G47.10 HYPERSOMNIA: ICD-10-CM

## 2022-01-11 DIAGNOSIS — G35 MULTIPLE SCLEROSIS: ICD-10-CM

## 2022-01-11 DIAGNOSIS — G47.00 INSOMNIA, UNSPECIFIED TYPE: ICD-10-CM

## 2022-01-11 PROCEDURE — 99204 OFFICE O/P NEW MOD 45 MIN: CPT | Performed by: FAMILY MEDICINE

## 2022-01-11 RX ORDER — GABAPENTIN 100 MG/1
100 CAPSULE ORAL
Qty: 30 CAPSULE | Refills: 1 | Status: SHIPPED | OUTPATIENT
Start: 2022-01-11 | End: 2022-03-15 | Stop reason: SDUPTHER

## 2022-01-11 RX ORDER — PRAMIPEXOLE DIHYDROCHLORIDE 0.5 MG/1
TABLET ORAL
Qty: 60 TABLET | Refills: 0 | Status: SHIPPED | OUTPATIENT
Start: 2022-01-11 | End: 2022-03-15

## 2022-01-11 NOTE — PROGRESS NOTES
[facility] New Patient/Consultation       Reason for Consultation: Insomnia and restless leg syndrome daytime somnolence      Patient Care Team:  Jonnie Crowell MD as PCP - General  Jonnie Crowell MD as PCP - Family Medicine  Nidia Salas MD as Consulting Physician (Sleep Medicine)      History of present illness:  Thank you for asking me to see your patient.  The patient is a 61 y.o. female with history of meningioma fibromyalgia osteoarthritis MS and restless leg syndrome presents today with concern for sleep disorder.  Patient reports approximately a year ago her RLS has gotten worse.  She falls asleep quickly however wakes up 2 to 3 hours afterwards.  Likely if she gets 4 hours of sleep.  Has had a sleep study in the past; over 20 years ago and was negative for sleep apnea at that time.  Reports hypersomnia nonrestorative sleep weight gain of 75 pounds in the past 5 years waking with dry mouth occasionally leg jerking at night.  Sensations which are worse at night where movement helps.  Also reports nocturia up to one time a night and restless sleep. Ferritin at the end of October was 69.  Patient reports has been on Mirapex for over 10 years.  Currently on 1.5 mg a day.  Over the past year or so restless leg symptoms have significantly worsened despite increased dosage of Mirapex.  Symptoms occur during the day now and sometimes in her arm as well.  Discussed with patient this is likely due to augmentation which can occur with Mirapex or ropinirole.  Primary care recently prescribed ropinirole gabapentin and trazodone; gabapentin was partly for other pain issues that she has.  Patient tried gabapentin 300 mg in the morning the other day however was very dizzy and lightheaded and discontinued.  Has not taken at night yet.  Has not started ropinirole or trazodone yet.  She is not currently on any medication for her MS.  Has appointment with neurology coming up in  "February.    Sleep Schedule:  Bed time: Midnight to 1 AM  Sleep latency: Less than 30 minutes  Wake time: Varies  Average hours slept: 4-5  Non-restorative sleep: Yes  Number of naps per day: Zero  Rotating shifts?:  No  Nocturia: Up to one time a night  Electronics in bedroom: No    Excessive daytime sleepiness or drowsiness:Y  Any accidents at work due to sleepiness in the last 5 years:N  Any difficulty driving due to sleepiness or being drowsy: N  Weight changed in the last 5 years:Y - GAINED 75 LBS    Snoring:N  Witnessed apneas:N  Have you ever awakened gasping for breath, coughing, choking or respiratory discomfort: N  Morning headaches: N  Awaken with a sore throat or dry mouth: Y    Any reports of leg jerking at night: Y  Urge sensations: Y - WORSE AT NIGHT AND MOVEMENT HELPS  Does pain disrupt sleep: Y  Sweating during sleep: N  Teeth grinding: N    Any sudden episodes of sleep during the day: N  Sleep paralysis/hallucinations: N  Muscle weakness with laughing/anger: N  Nightmares: N  Sleep walking: N    Are you sleepy when you increase your sleep time: N  Do you sleep better away from your own bed: N    ESS: 16    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    Family History: NELL no       Current Outpatient Medications:   •  gabapentin (NEURONTIN) 100 MG capsule, Take 1 capsule by mouth every night at bedtime., Disp: 30 capsule, Rfl: 1  •  pramipexole (Mirapex) 0.5 MG tablet, Take 2 tabs QHS x 2 weeks, then 1 tab QHS x 2 weeks then 1/2 tab QHS x 2 weeks then D/C, Disp: 60 tablet, Rfl: 0    Vital Signs:    Vitals:    01/11/22 0940   BP: 147/67   Pulse: 75   SpO2: 99%   Weight: 111 kg (245 lb)   Height: 170.4 cm (67.1\")    "   Body mass index is 38.26 kg/m².  Neck Circumference: 15 inches      Physical Exam:   General Alert and oriented. No acute distress noted   Pharynx/Throat Class III Mallampati airway, large tongue, no evidence of redundant lateral pharyngeal tissue. No oral lesions. No thrush. Moist mucous membranes.   Head Normocephalic. Symmetrical. Atraumatic.    Nose No septal deviation. No drainage   Chest Wall Normal shape. Symmetric expansion with respiration. No tenderness.   Neck Trachea midline, no thyromegaly or adenopathy    Lungs Clear to auscultation bilaterally. No wheezes. No rhonchi. No rales. Respirations regular, even and unlabored.   Heart Regular rhythm and normal rate. Normal S1 and S2. No murmur   Abdomen Soft, non-tender and non-distended. Normal bowel sounds. No masses.   Extremities Moves all extremities well. No edema   Psychiatric Normal mood and affect.       Impression:  1. Restless legs syndrome (RLS)    2. Hypersomnia    3. Non-restorative sleep    4. Obesity with serious comorbidity, unspecified classification, unspecified obesity type    5. Insomnia, unspecified type    6. Multiple sclerosis (HCC)        Plan:    Good sleep hygiene measures should be maintained.  Weight loss would be beneficial in this patient who is obese with BMI 38.3.    I discussed the pathophysiology of obstructive sleep apnea with the patient.  We discussed the adverse outcomes associated with untreated sleep-disordered breathing.  We discussed treatment modalities of obstructive sleep apnea including CPAP device as well as oral mandibular advancement device. Sleep study will be scheduled to establish definitive diagnosis of sleep disorder breathing.  Weight loss will be strongly beneficial in order to reduce the severity of sleep-disordered breathing.  Patient has narrow oropharyngeal structure.  Caution during activities that require prolonged concentration is strongly advised.  Patient will be notified of sleep study  results after sleep study is completed.  If sleep apnea is only mild,  oral mandibular advancement device may be one of the treatment options.  However if sleep apnea is moderately severe, CPAP treatment will be strongly encouraged.  The patient is not opposed to treatment with CPAP device if we confirm significant obstructive sleep apnea on polysomnography.    Significant concern for augmentation with the Mirapex.  Discussed we need to wean off of Mirapex; given the schedule to help wean off of Mirapex over the next 6 or more weeks.  At the same time we will start gabapentin 100 mg nightly; advised not to take gabapentin and Mirapex together; advised to take about 2 hours apart.  Take gabapentin closer to bedtime.  Advised to discontinue ropinirole and trazodone.  In terms of gabapentin use during the day, advised patient to discuss with her primary care in more detail for her pain issues.  Discussed that weaning off of the medication like Mirapex can take some time we may need to take a longer time than anticipated.  Will not check ferritin as it was just checked a few months ago and was normal.  Discussed medication like gabapentin and Mirapex can cause orthostatic hypotension; advised to sit up in bed after waking in the morning for 2 to 3 minutes before getting up out of bed.  Patient expressed understanding and agreeable to plan.    Discussed untreated sleep apnea can also contribute to restless leg symptoms; therefore we will follow-up home sleep study.  Return to clinic in 1 month for follow-up or sooner if needed.    Thank you for allowing me to participate in your patient's care.    Nidia Salas MD  Sleep Medicine  01/11/22  10:10 EST

## 2022-02-08 ENCOUNTER — APPOINTMENT (OUTPATIENT)
Dept: SLEEP MEDICINE | Facility: HOSPITAL | Age: 62
End: 2022-02-08

## 2022-02-15 ENCOUNTER — APPOINTMENT (OUTPATIENT)
Dept: SLEEP MEDICINE | Facility: HOSPITAL | Age: 62
End: 2022-02-15

## 2022-03-15 ENCOUNTER — OFFICE VISIT (OUTPATIENT)
Dept: SLEEP MEDICINE | Facility: HOSPITAL | Age: 62
End: 2022-03-15

## 2022-03-15 VITALS
OXYGEN SATURATION: 98 % | HEART RATE: 75 BPM | WEIGHT: 233 LBS | BODY MASS INDEX: 36.57 KG/M2 | DIASTOLIC BLOOD PRESSURE: 66 MMHG | HEIGHT: 67 IN | SYSTOLIC BLOOD PRESSURE: 135 MMHG

## 2022-03-15 DIAGNOSIS — G47.10 HYPERSOMNIA: Primary | ICD-10-CM

## 2022-03-15 DIAGNOSIS — G47.8 NON-RESTORATIVE SLEEP: ICD-10-CM

## 2022-03-15 DIAGNOSIS — G35 MULTIPLE SCLEROSIS: ICD-10-CM

## 2022-03-15 DIAGNOSIS — G25.81 RESTLESS LEGS SYNDROME (RLS): ICD-10-CM

## 2022-03-15 DIAGNOSIS — E66.9 OBESITY WITH SERIOUS COMORBIDITY, UNSPECIFIED CLASSIFICATION, UNSPECIFIED OBESITY TYPE: ICD-10-CM

## 2022-03-15 DIAGNOSIS — G47.00 INSOMNIA, UNSPECIFIED TYPE: ICD-10-CM

## 2022-03-15 PROCEDURE — G0463 HOSPITAL OUTPT CLINIC VISIT: HCPCS

## 2022-03-15 PROCEDURE — 99214 OFFICE O/P EST MOD 30 MIN: CPT | Performed by: FAMILY MEDICINE

## 2022-03-15 RX ORDER — PRAMIPEXOLE DIHYDROCHLORIDE 0.5 MG/1
TABLET ORAL
Qty: 45 TABLET | Refills: 1 | Status: SHIPPED | OUTPATIENT
Start: 2022-03-15 | End: 2022-05-10 | Stop reason: SDUPTHER

## 2022-03-15 RX ORDER — GABAPENTIN 100 MG/1
100 CAPSULE ORAL
Qty: 30 CAPSULE | Refills: 1 | Status: SHIPPED | OUTPATIENT
Start: 2022-03-15 | End: 2022-05-10

## 2022-03-15 NOTE — PROGRESS NOTES
Follow Up Sleep Disorders Center Note     Chief Complaint: RLS    Primary Care Physician: Jonnie Crowell MD    Cayla Lopez is a 61 y.o.female  was last seen at [facility] sleep lab: 1/11/2022.  Restless leg syndrome has gotten worse.  Last sleep study was over 20 years ago was negative for sleep apnea at the time.  Has had significant weight gain since then.  Was having symptoms concerning for augmentation at last visit; increased dosage of Mirapex led to worsening symptoms.  Had tried gabapentin 300 mg in the morning however felt very dizzy and lightheaded and discontinued.  Has not yet started ropinirole or trazodone.  At last visit I ordered a home sleep study to reassess her obstructive sleep apnea however insurance denied the study.  There was significant concern for augmentation with Mirapex.  We discussed trying to wean off of Mirapex at the same time start gabapentin 100 mg nightly.  Ferritin a few months ago was normal.  Has had difficulty with weaning off of Mirapex; had COVID-19 infection last month which made it difficult.  Currently taking 0.75 mg nightly of Mirapex.  Very difficult to wean off.  Has not added gabapentin.      Current Medications:    Current Outpatient Medications:   •  gabapentin (NEURONTIN) 100 MG capsule, Take 1 capsule by mouth every night at bedtime., Disp: 30 capsule, Rfl: 1  •  pramipexole (MIRAPEX) 0.5 MG tablet, TAKE 1 TAB AT BEDTIME FOR 2 WEEKS THEN 1/2 AT BEDTIME FOR 2 WEEKS THEN STOP, Disp: 45 tablet, Rfl: 1   also entered in Sleep Questionnaire    Patient  has a past medical history of Acute tear medial meniscus, Arthritis, MS (multiple sclerosis) ([facility]), and Vitamin D deficiency.    Social History:    Social History     Socioeconomic History   • Marital status:    Tobacco Use   • Smoking status: Former Smoker     Packs/day: 0.25     Years: 3.00     Pack years: 0.75     Types: Cigarettes   • Smokeless tobacco: Never Used   Substance and Sexual Activity   • Alcohol  "use: Yes     Comment: HOLIDAYS   • Drug use: No   • Sexual activity: Not Currently     Partners: Male       Allergies:  Patient has no known allergies.    Vital Signs:    Vitals:    03/15/22 0915   BP: 135/66   Pulse: 75   SpO2: 98%   Weight: 106 kg (233 lb)   Height: 170.4 cm (67.1\")     Body mass index is 36.38 kg/m².    REVIEW OF SYSTEMS.  Full review of systems available on the intake form which is scanned in the media tab.  The relevant positive are noted below  1. Daytime excessive sleepiness with [place] Sleepiness Scale :Total score: 14   2. Snoring  3. Shortness of breath anxiety depression      Physical exam:  Vitals:    03/15/22 0915   BP: 135/66   Pulse: 75   SpO2: 98%   Weight: 106 kg (233 lb)   Height: 170.4 cm (67.1\")    Body mass index is 36.38 kg/m².    HEENT: Head is atraumatic, normocephalic  Eyes: pupils are round equal and reacting to light and accommodation, conjunctiva normal  Nose: no nasal septal defects or deviation and the nasal passages are clear, no nasal polyps,  Throat: tongue normal, oral airway Mallampati class 3  NECK: , trachea is in the midline, thyroid not enlarged  RESPIRATORY SYSTEM: Breath sounds are equal on both sides, there are no wheezes   CARDIOVASULAR SYSTEM: Heart sounds are regular rhythm and yao rate, no edema  EXTREMITES: No cyanosis, clubbing  NEUROLOGICAL SYSTEM: Oriented x 3, no gross motor defects, gait normal    Impression:  1. Hypersomnia    2. Non-restorative sleep    3. Restless legs syndrome (RLS)    4. Obesity with serious comorbidity, unspecified classification, unspecified obesity type    5. Multiple sclerosis (HCC)    6. Insomnia, unspecified type      Start gabapentin 100 mg nightly; take this 1 hour after taking Mirapex.  Continue with weaning off of Mirapex.  0.5 mg nightly x2 weeks then 0.25 mg nightly x2 weeks then hopefully discontinue.  Patient will call if she has any issues with withdrawal symptoms.  Discussed both medications are CNS " depressing medications; if any symptoms of dizziness lightheadedness hypotension patient will call and let us know and discontinue medication.  Discussed sitting up in bed for 2 to 3 minutes before walking out of bed to help prevent any orthostatic hypotension.  Return to clinic in 1 month for follow-up or sooner if needed.    Weight loss will be strongly beneficial to reduce the severity of sleep-disordered breathing.  Caution during activities that require prolonged concentration is strongly advised if sleepiness returns.     Nidia Salas MD  Sleep Medicine  03/15/22  09:36 EDT

## 2022-05-02 RX ORDER — ROPINIROLE 2 MG/1
TABLET, FILM COATED ORAL
Qty: 30 TABLET | Refills: 5 | OUTPATIENT
Start: 2022-05-02

## 2022-05-02 NOTE — TELEPHONE ENCOUNTER
Rx Refill Note  Requested Prescriptions     Pending Prescriptions Disp Refills   • rOPINIRole (REQUIP) 2 MG tablet [Pharmacy Med Name: ROPINIROLE 2MG TABLETS] 30 tablet 5     Sig: TAKE 1 TABLET BY MOUTH EVERY NIGHT 1 HOUR BEFORE BEDTIME      Last office visit with prescribing clinician: 11/24/2021      Next office visit with prescribing clinician: Visit date not found            Jaron Morgan MA  05/02/22, 07:19 EDT

## 2022-05-10 ENCOUNTER — OFFICE VISIT (OUTPATIENT)
Dept: SLEEP MEDICINE | Facility: HOSPITAL | Age: 62
End: 2022-05-10

## 2022-05-10 VITALS
WEIGHT: 248.4 LBS | DIASTOLIC BLOOD PRESSURE: 64 MMHG | HEART RATE: 77 BPM | OXYGEN SATURATION: 97 % | BODY MASS INDEX: 38.99 KG/M2 | SYSTOLIC BLOOD PRESSURE: 140 MMHG | HEIGHT: 67 IN

## 2022-05-10 DIAGNOSIS — G47.8 NON-RESTORATIVE SLEEP: ICD-10-CM

## 2022-05-10 DIAGNOSIS — E66.9 OBESITY WITH SERIOUS COMORBIDITY, UNSPECIFIED CLASSIFICATION, UNSPECIFIED OBESITY TYPE: ICD-10-CM

## 2022-05-10 DIAGNOSIS — G25.81 RESTLESS LEGS SYNDROME (RLS): ICD-10-CM

## 2022-05-10 DIAGNOSIS — G47.10 HYPERSOMNIA: ICD-10-CM

## 2022-05-10 DIAGNOSIS — G47.00 INSOMNIA, UNSPECIFIED TYPE: ICD-10-CM

## 2022-05-10 DIAGNOSIS — G35 MULTIPLE SCLEROSIS: ICD-10-CM

## 2022-05-10 PROCEDURE — G0463 HOSPITAL OUTPT CLINIC VISIT: HCPCS

## 2022-05-10 PROCEDURE — 99214 OFFICE O/P EST MOD 30 MIN: CPT | Performed by: FAMILY MEDICINE

## 2022-05-10 RX ORDER — GABAPENTIN 100 MG/1
CAPSULE ORAL
Qty: 90 CAPSULE | Refills: 1 | Status: SHIPPED | OUTPATIENT
Start: 2022-05-10 | End: 2022-07-06 | Stop reason: SDUPTHER

## 2022-05-10 RX ORDER — PRAMIPEXOLE DIHYDROCHLORIDE 0.5 MG/1
TABLET ORAL
Qty: 45 TABLET | Refills: 1 | Status: SHIPPED | OUTPATIENT
Start: 2022-05-10 | End: 2022-07-06 | Stop reason: SDUPTHER

## 2022-05-10 NOTE — PROGRESS NOTES
Follow Up [facility] Note     Chief Complaint: RLS    Primary Care Physician: Jonnie Crowell MD    Cayla Lopez is a 61 y.o.female  was last seen at [facility] sleep lab: 1/11/2022.  Restless leg syndrome has gotten worse.  Last sleep study was over 20 years ago was negative for sleep apnea at the time.  Has had significant weight gain since then.  Was having symptoms concerning for augmentation at last visit; increased dosage of Mirapex led to worsening symptoms.  Had tried gabapentin 300 mg in the morning however felt very dizzy and lightheaded and discontinued.  Has not yet started ropinirole or trazodone.  At last visit I ordered a home sleep study to reassess her obstructive sleep apnea however insurance denied the study.  There was significant concern for augmentation with Mirapex.  We discussed trying to wean off of Mirapex at the same time start gabapentin 100 mg nightly.  Ferritin a few months ago was normal.  Has had difficulty with weaning off of Mirapex; had COVID-19 infection last month which made it difficult.  Currently taking 0.75 mg nightly of Mirapex.  Very difficult to wean off.  Has not added gabapentin.    5/10/2022: Since last visit patient has weaned Mirapex down to 0.5 mg nightly; less than that is unbearable in terms of RLS symptoms.  She has started gabapentin 100 mg nightly was which has helped with nighttime symptoms.  There are still some nights where she just cannot get to sleep because of restless legs.  Continues to have daytime symptoms.  Ferritin is above 45.      Current Medications:    Current Outpatient Medications:   •  gabapentin (NEURONTIN) 100 MG capsule, Take 1 capsule every afternoon then take 2 capsules QHS, Disp: 90 capsule, Rfl: 1  •  pramipexole (MIRAPEX) 0.5 MG tablet, TAKE 1 TAB AT BEDTIME FOR 2 WEEKS THEN 1/2 AT BEDTIME FOR 2 WEEKS THEN STOP, Disp: 45 tablet, Rfl: 1   also entered in Sleep Questionnaire    Patient  has a past medical history of Acute  "tear medial meniscus, Arthritis, MS (multiple sclerosis) (HCC), and Vitamin D deficiency.    Social History:    Social History     Socioeconomic History   • Marital status:    Tobacco Use   • Smoking status: Former Smoker     Packs/day: 0.25     Years: 3.00     Pack years: 0.75     Types: Cigarettes   • Smokeless tobacco: Never Used   Substance and Sexual Activity   • Alcohol use: Yes     Comment: HOLIDAYS   • Drug use: No   • Sexual activity: Not Currently     Partners: Male       Allergies:  Patient has no known allergies.    Vital Signs:    Vitals:    05/10/22 0700   BP: 140/64   Pulse: 77   SpO2: 97%   Weight: 113 kg (248 lb 6.4 oz)   Height: 170.2 cm (67\")     Body mass index is 38.9 kg/m².    REVIEW OF SYSTEMS.  Full review of systems available on the intake form which is scanned in the media tab.  The relevant positive are noted below  1. Daytime excessive sleepiness with [place] Sleepiness Scale :Total score: 12       Physical exam:  Vitals:    05/10/22 0700   BP: 140/64   Pulse: 77   SpO2: 97%   Weight: 113 kg (248 lb 6.4 oz)   Height: 170.2 cm (67\")    Body mass index is 38.9 kg/m².    HEENT: Head is atraumatic, normocephalic  Eyes: pupils are round equal and reacting to light and accommodation, conjunctiva normal  Nose: no nasal septal defects or deviation and the nasal passages are clear, no nasal polyps,  Throat: tongue normal, oral airway Mallampati class 3  NECK: , trachea is in the midline, thyroid not enlarged  RESPIRATORY SYSTEM: Breath sounds are equal on both sides, there are no wheezes   CARDIOVASULAR SYSTEM: Heart sounds are regular rhythm and yao rate, no edema  EXTREMITES: No cyanosis, clubbing  NEUROLOGICAL SYSTEM: Oriented x 3, no gross motor defects, gait normal    Impression:  1. Restless legs syndrome (RLS)    2. Hypersomnia    3. Non-restorative sleep    4. Obesity with serious comorbidity, unspecified classification, unspecified obesity type    5. Insomnia, unspecified type  "   6. Multiple sclerosis (HCC)      Continue gabapentin 100 mg nightly and Mirapex 0.5 mg nightly; continue taking gabapentin 1 hour after taking Mirapex.  Add gabapentin 100 mg q. afternoon.  After about a week can increase nighttime gabapentin dosage to 200 mg.  Can also try further weaning Mirapex to 0.25 mg at this time.  Patient will call if she has any issues with withdrawal symptoms.  Discussed both medications are CNS depressing medications; if any symptoms of dizziness lightheadedness hypotension patient will call and let us know and discontinue medication.  Discussed sitting up in bed for 2 to 3 minutes before walking out of bed to help prevent any orthostatic hypotension.  Return to clinic in 6 weeks for follow-up or sooner if needed.    Weight loss will be strongly beneficial to reduce the severity of sleep-disordered breathing.  Caution during activities that require prolonged concentration is strongly advised if sleepiness returns.     Nidia Salas MD  Sleep Medicine  05/10/22  08:38 EDT

## 2022-07-06 ENCOUNTER — OFFICE VISIT (OUTPATIENT)
Dept: SLEEP MEDICINE | Facility: HOSPITAL | Age: 62
End: 2022-07-06

## 2022-07-06 VITALS
HEIGHT: 67 IN | OXYGEN SATURATION: 99 % | HEART RATE: 62 BPM | DIASTOLIC BLOOD PRESSURE: 63 MMHG | WEIGHT: 247 LBS | BODY MASS INDEX: 38.77 KG/M2 | SYSTOLIC BLOOD PRESSURE: 137 MMHG

## 2022-07-06 DIAGNOSIS — E66.9 OBESITY WITH SERIOUS COMORBIDITY, UNSPECIFIED CLASSIFICATION, UNSPECIFIED OBESITY TYPE: ICD-10-CM

## 2022-07-06 DIAGNOSIS — G25.81 RESTLESS LEGS SYNDROME (RLS): Primary | ICD-10-CM

## 2022-07-06 PROCEDURE — 99214 OFFICE O/P EST MOD 30 MIN: CPT | Performed by: FAMILY MEDICINE

## 2022-07-06 PROCEDURE — G0463 HOSPITAL OUTPT CLINIC VISIT: HCPCS

## 2022-07-06 RX ORDER — PRAMIPEXOLE DIHYDROCHLORIDE 0.5 MG/1
TABLET ORAL
Qty: 45 TABLET | Refills: 5 | Status: SHIPPED | OUTPATIENT
Start: 2022-07-06 | End: 2022-12-07 | Stop reason: SDUPTHER

## 2022-07-06 RX ORDER — GABAPENTIN 100 MG/1
CAPSULE ORAL
Qty: 180 CAPSULE | Refills: 1 | Status: SHIPPED | OUTPATIENT
Start: 2022-07-06 | End: 2023-03-30 | Stop reason: ALTCHOICE

## 2022-07-06 NOTE — PROGRESS NOTES
Follow Up Sleep Disorders Center Note     Chief Complaint: RLS    Primary Care Physician: Jonnie Crowell MD    Cayla Lopez is a 61 y.o.female  was last seen at [facility] sleep lab: 1/11/2022.  Restless leg syndrome has gotten worse.  Last sleep study was over 20 years ago was negative for sleep apnea at the time.  Has had significant weight gain since then.  Was having symptoms concerning for augmentation at last visit; increased dosage of Mirapex led to worsening symptoms.  Had tried gabapentin 300 mg in the morning however felt very dizzy and lightheaded and discontinued.  Has not yet started ropinirole or trazodone.  At last visit I ordered a home sleep study to reassess her obstructive sleep apnea however insurance denied the study.  There was significant concern for augmentation with Mirapex.  We discussed trying to wean off of Mirapex at the same time start gabapentin 100 mg nightly.  Ferritin a few months ago was normal.  Has had difficulty with weaning off of Mirapex; had COVID-19 infection last month which made it difficult.  Currently taking 0.75 mg nightly of Mirapex.  Very difficult to wean off.  Has not added gabapentin.    5/10/2022: Since last visit patient has weaned Mirapex down to 0.5 mg nightly; less than that is unbearable in terms of RLS symptoms.  She has started gabapentin 100 mg nightly was which has helped with nighttime symptoms.  There are still some nights where she just cannot get to sleep because of restless legs.  Continues to have daytime symptoms.  Ferritin is above 45.    7/6/2022: At last visit advised to continue gabapentin 100 mg nightly and Mirapex 0.5 mg nightly; continue taking gabapentin 1 hour after taking Mirapex.  Add gabapentin 100 mg q. afternoon.  After about a week can increase nighttime gabapentin dosage to 200 mg.  Can also try further weaning Mirapex to 0.25 mg at this time.  Patient will call if she has any issues with withdrawal symptoms.  Today patient  "reports afternoon dosage of gabapentin made her too drowsy so she discontinued it however sleeping better with 200 mg nightly and Mirapex 0.5 mg nightly.  Still working on weaning off Mirapex; daytime symptoms are maybe once every while not often not consistently.      Current Medications:    Current Outpatient Medications:   •  gabapentin (NEURONTIN) 100 MG capsule, Take 2 capsules QHS, Disp: 180 capsule, Rfl: 1  •  pramipexole (MIRAPEX) 0.5 MG tablet, TAKE 1 TAB AT BEDTIME FOR 2 WEEKS THEN 1/2 AT BEDTIME FOR 2 WEEKS THEN STOP, Disp: 45 tablet, Rfl: 5   also entered in Sleep Questionnaire    Patient  has a past medical history of Acute tear medial meniscus, Arthritis, MS (multiple sclerosis) ([facility]), and Vitamin D deficiency.    Social History:    Social History     Socioeconomic History   • Marital status:    Tobacco Use   • Smoking status: Former Smoker     Packs/day: 0.25     Years: 3.00     Pack years: 0.75     Types: Cigarettes   • Smokeless tobacco: Never Used   Substance and Sexual Activity   • Alcohol use: Yes     Comment: HOLIDAYS   • Drug use: No   • Sexual activity: Not Currently     Partners: Male       Allergies:  Patient has no known allergies.    Vital Signs:    Vitals:    07/06/22 0700   BP: 137/63   Pulse: 62   SpO2: 99%   Weight: 112 kg (247 lb)   Height: 170.2 cm (67.01\")     Body mass index is 38.68 kg/m².    REVIEW OF SYSTEMS.  Full review of systems available on the intake form which is scanned in the media tab.  The relevant positive are noted below  1. Daytime excessive sleepiness with [place] Sleepiness Scale :Total score: 10       Physical exam:  Vitals:    07/06/22 0700   BP: 137/63   Pulse: 62   SpO2: 99%   Weight: 112 kg (247 lb)   Height: 170.2 cm (67.01\")    Body mass index is 38.68 kg/m².    HEENT: Head is atraumatic, normocephalic  Eyes: pupils are round equal and reacting to light and accommodation, conjunctiva normal  Nose: no nasal septal defects or deviation and the nasal " passages are clear, no nasal polyps,  Throat: tongue normal, oral airway Mallampati class 3  NECK: , trachea is in the midline, thyroid not enlarged  RESPIRATORY SYSTEM: Breath sounds are equal on both sides, there are no wheezes   CARDIOVASULAR SYSTEM: Heart sounds are regular rhythm and yao rate, no edema  EXTREMITES: No cyanosis, clubbing  NEUROLOGICAL SYSTEM: Oriented x 3, no gross motor defects, gait normal    Impression:  1. Restless legs syndrome (RLS)    2. Obesity with serious comorbidity, unspecified classification, unspecified obesity type      Continue gabapentin 200 mg nightly and Mirapex 0.5 mg nightly. Can also try further weaning Mirapex to 0.25 mg at this time.  Patient will call if she has any issues with withdrawal symptoms.  Discussed both medications are CNS depressing medications; if any symptoms of dizziness lightheadedness hypotension patient will call and let us know and discontinue medication.  Discussed sitting up in bed for 2 to 3 minutes before walking out of bed to help prevent any orthostatic hypotension.  Return to clinic in 6 weeks for follow-up or sooner if needed.    Weight loss will be strongly beneficial to reduce the severity of sleep-disordered breathing.  Caution during activities that require prolonged concentration is strongly advised if sleepiness returns.    Return to clinic 6 months for follow-up or sooner if needed.    Nidia Salas MD  Sleep Medicine  07/06/22  08:57 EDT

## 2022-12-07 ENCOUNTER — OFFICE VISIT (OUTPATIENT)
Dept: SLEEP MEDICINE | Facility: HOSPITAL | Age: 62
End: 2022-12-07

## 2022-12-07 VITALS
WEIGHT: 236 LBS | SYSTOLIC BLOOD PRESSURE: 135 MMHG | HEART RATE: 75 BPM | BODY MASS INDEX: 37.04 KG/M2 | OXYGEN SATURATION: 97 % | HEIGHT: 67 IN | DIASTOLIC BLOOD PRESSURE: 68 MMHG

## 2022-12-07 DIAGNOSIS — E66.9 OBESITY WITH SERIOUS COMORBIDITY, UNSPECIFIED CLASSIFICATION, UNSPECIFIED OBESITY TYPE: ICD-10-CM

## 2022-12-07 DIAGNOSIS — G25.81 RESTLESS LEGS SYNDROME (RLS): Primary | ICD-10-CM

## 2022-12-07 PROCEDURE — G0463 HOSPITAL OUTPT CLINIC VISIT: HCPCS

## 2022-12-07 PROCEDURE — 99214 OFFICE O/P EST MOD 30 MIN: CPT | Performed by: FAMILY MEDICINE

## 2022-12-07 RX ORDER — PRAMIPEXOLE DIHYDROCHLORIDE 0.5 MG/1
TABLET ORAL
Qty: 45 TABLET | Refills: 5 | Status: SHIPPED | OUTPATIENT
Start: 2022-12-07

## 2022-12-07 NOTE — PROGRESS NOTES
Follow Up Sleep Disorders Center Note     Chief Complaint: RLS    Primary Care Physician: Jonnie Crowell MD    Cayla Lopez is a 61 y.o.female  was last seen at [facility] sleep lab: 1/11/2022.  Restless leg syndrome has gotten worse.  Last sleep study was over 20 years ago was negative for sleep apnea at the time.  Has had significant weight gain since then.  Was having symptoms concerning for augmentation at last visit; increased dosage of Mirapex led to worsening symptoms.  Had tried gabapentin 300 mg in the morning however felt very dizzy and lightheaded and discontinued.  Has not yet started ropinirole or trazodone.  At last visit I ordered a home sleep study to reassess her obstructive sleep apnea however insurance denied the study.  There was significant concern for augmentation with Mirapex.  We discussed trying to wean off of Mirapex at the same time start gabapentin 100 mg nightly.  Ferritin a few months ago was normal.  Has had difficulty with weaning off of Mirapex; had COVID-19 infection last month which made it difficult.  Currently taking 0.75 mg nightly of Mirapex.  Very difficult to wean off.  Has not added gabapentin.    5/10/2022: Since last visit patient has weaned Mirapex down to 0.5 mg nightly; less than that is unbearable in terms of RLS symptoms.  She has started gabapentin 100 mg nightly was which has helped with nighttime symptoms.  There are still some nights where she just cannot get to sleep because of restless legs.  Continues to have daytime symptoms.  Ferritin is above 45.    7/6/2022: At last visit advised to continue gabapentin 100 mg nightly and Mirapex 0.5 mg nightly; continue taking gabapentin 1 hour after taking Mirapex.  Add gabapentin 100 mg q. afternoon.  After about a week can increase nighttime gabapentin dosage to 200 mg.  Can also try further weaning Mirapex to 0.25 mg at this time.  Patient will call if she has any issues with withdrawal symptoms.  Today patient  "reports afternoon dosage of gabapentin made her too drowsy so she discontinued it however sleeping better with 200 mg nightly and Mirapex 0.5 mg nightly.  Still working on weaning off Mirapex; daytime symptoms are maybe once every while not often not consistently.    12/7/2022: At last visit advised to continue gabapentin 200 mg nightly and Mirapex 0.5 mg nightly.  We discussed trying to further wean Mirapex to 0.25 mg.  Patient advised to call if she had any issues with withdrawal symptoms.  Advised 6-week follow-up.  Today patient reports still having daytime symptoms; difficulty to gabapentin during the day due to sleepiness which occurs.  Some nights are great with 100 mg gabapentin nightly and some nights are really bad.  Ferritin has not been checked in over a year.      Current Medications:    Current Outpatient Medications:   •  gabapentin (NEURONTIN) 100 MG capsule, Take 2 capsules QHS, Disp: 180 capsule, Rfl: 1  •  pramipexole (MIRAPEX) 0.5 MG tablet, TAKE 1 TAB AT BEDTIME FOR 2 WEEKS THEN 1/2 AT BEDTIME FOR 2 WEEKS THEN STOP, Disp: 45 tablet, Rfl: 5   also entered in Sleep Questionnaire    Patient  has a past medical history of Acute tear medial meniscus, Arthritis, MS (multiple sclerosis) ([facility]), and Vitamin D deficiency.    Social History:    Social History     Socioeconomic History   • Marital status:    Tobacco Use   • Smoking status: Former     Packs/day: 0.25     Years: 3.00     Pack years: 0.75     Types: Cigarettes   • Smokeless tobacco: Never   Substance and Sexual Activity   • Alcohol use: Yes     Comment: HOLIDAYS   • Drug use: No   • Sexual activity: Not Currently     Partners: Male       Allergies:  Patient has no known allergies.    Vital Signs:    Vitals:    12/07/22 0752   BP: 135/68   Pulse: 75   SpO2: 97%   Weight: 107 kg (236 lb)   Height: 170.2 cm (67.01\")     Body mass index is 36.95 kg/m².    REVIEW OF SYSTEMS.  Full review of systems available on the intake form which is " "scanned in the media tab.  The relevant positive are noted below  1. Daytime excessive sleepiness with [place] Sleepiness Scale :Total score: 9       Physical exam:  Vitals:    12/07/22 0752   BP: 135/68   Pulse: 75   SpO2: 97%   Weight: 107 kg (236 lb)   Height: 170.2 cm (67.01\")    Body mass index is 36.95 kg/m².    HEENT: Head is atraumatic, normocephalic  Eyes: pupils are round equal and reacting to light and accommodation, conjunctiva normal  Nose: no nasal septal defects or deviation and the nasal passages are clear, no nasal polyps,  Throat: tongue normal, oral airway Mallampati class 3  NECK: , trachea is in the midline, thyroid not enlarged  RESPIRATORY SYSTEM: Breath sounds are equal on both sides, there are no wheezes   CARDIOVASULAR SYSTEM: Heart sounds are regular rhythm and yao rate, no edema  EXTREMITES: No cyanosis, clubbing  NEUROLOGICAL SYSTEM: Oriented x 3, no gross motor defects, gait normal    Impression:  1. Restless legs syndrome (RLS)    2. Obesity with serious comorbidity, unspecified classification, unspecified obesity type      Need to wean off of Mirapex due to concern for augmentation.  Decrease Mirapex to 0.25 mg nightly at the same time can increase nighttime gabapentin dosage to 200 mg and if needed can do 100 mg gabapentin during the day.  Patient will call if she has any issues with withdrawal symptoms.  Follow-up ferritin level to see if iron supplementation will help.  Discussed both medications are CNS depressing medications; if any symptoms of dizziness lightheadedness hypotension patient will call and let us know and discontinue medication.  Discussed sitting up in bed for 2 to 3 minutes before walking out of bed to help prevent any orthostatic hypotension.  Return to clinic in 1 month for follow-up or sooner if needed.    Weight loss will be strongly beneficial to reduce the severity of sleep-disordered breathing.  Caution during activities that require prolonged " concentration is strongly advised if sleepiness returns.    Nidia Salas MD  Sleep Medicine  12/07/22  08:24 EST

## 2023-01-18 ENCOUNTER — APPOINTMENT (OUTPATIENT)
Dept: SLEEP MEDICINE | Facility: HOSPITAL | Age: 63
End: 2023-01-18
Payer: COMMERCIAL

## 2023-03-30 ENCOUNTER — LAB (OUTPATIENT)
Dept: LAB | Facility: HOSPITAL | Age: 63
End: 2023-03-30
Payer: COMMERCIAL

## 2023-03-30 ENCOUNTER — OFFICE VISIT (OUTPATIENT)
Dept: SLEEP MEDICINE | Facility: HOSPITAL | Age: 63
End: 2023-03-30
Payer: COMMERCIAL

## 2023-03-30 VITALS
BODY MASS INDEX: 37.35 KG/M2 | OXYGEN SATURATION: 97 % | SYSTOLIC BLOOD PRESSURE: 142 MMHG | WEIGHT: 238 LBS | HEIGHT: 67 IN | DIASTOLIC BLOOD PRESSURE: 60 MMHG | HEART RATE: 72 BPM

## 2023-03-30 DIAGNOSIS — G25.81 RESTLESS LEGS SYNDROME (RLS): Primary | ICD-10-CM

## 2023-03-30 DIAGNOSIS — G25.81 RESTLESS LEGS SYNDROME (RLS): ICD-10-CM

## 2023-03-30 LAB — FERRITIN SERPL-MCNC: 98.3 NG/ML (ref 13–150)

## 2023-03-30 PROCEDURE — 82728 ASSAY OF FERRITIN: CPT | Performed by: FAMILY MEDICINE

## 2023-03-30 PROCEDURE — G0463 HOSPITAL OUTPT CLINIC VISIT: HCPCS

## 2023-03-30 PROCEDURE — 36415 COLL VENOUS BLD VENIPUNCTURE: CPT | Performed by: FAMILY MEDICINE

## 2023-03-30 PROCEDURE — 99214 OFFICE O/P EST MOD 30 MIN: CPT | Performed by: NURSE PRACTITIONER

## 2023-03-30 NOTE — PROGRESS NOTES
"  [facility]  [address]  [address]  [place] [address]  Phone   Fax         SLEEP CLINIC FOLLOW-UP PROGRESS NOTE    Cayla Lopez  [id number]   1960  62 y.o.  female      PCP: Jonnie Crowell MD    DATE OF VISIT: 3/30/2023        CHIEF COMPLAINT: Restless leg syndrome    HPI:  This is a 62 y.o. year old patient who presents to the clinic today for the management of restless leg syndrome.  She was last seen by Dr. Salas 12/2022 and plan was to wean her off of Mirapex and onto low-dose gabapentin.  She also reports she never got her iron levels checked after last visit.    She reports that she just could not tolerate the gabapentin, even at 100 mg nightly.  She said it did not help but she could not increase the dose further than this as it caused significant morning fatigue.  She feels she is pretty sensitive to medications.  She reports she has not been able to wean down more than .5mg per day.  She does notice some improvement in symptoms if she takes the 0.25 mg dose in the afternoon and then another 0.25 mg at bedtime though has not been doing this regularly.          MEDICATIONS: reviewed     ALLERGIES:  Patient has no known allergies.    SOCIAL HISTORY (habits pertaining to sleep medicine):  • History tobacco use: No   • History of alcohol use: 0 per week  • Caffeine use: 1-2     REVIEW OF SYSTEMS:   Pertinent positive symptoms are:  • [place] Sleepiness Scale :Total score: 10   • Depression        PHYSICAL EXAMINATION:  CONSTITUTIONAL:  Vitals:    03/30/23 0825   BP: 142/60   Pulse: 72   SpO2: 97%   Weight: 108 kg (238 lb)   Height: 170.2 cm (67.01\")    Body mass index is 37.26 kg/m².   HEAD: atraumatic, normocephalic  RESP SYSTEM: not in respiratory distress, breathing unlabored  CARDIOVASULAR: normal rate, no edema noted   NEURO: Alert and oriented x 3, mood and affect appeared appropriate          ASSESSMENT AND PLAN:  · Restless leg " syndrome: She has noticed some improvement with taking 0.25 mg of Mirapex in the afternoon and another 0.25 mg at bedtime but has not been doing this regularly.  We will check ferritin levels today.  We will discuss plan further with Dr. Honeycutt as well.  ADDENDUM: Ferritin level within normal limits.  Discussed patient case and plan of care with Dr. Honeycutt.  Patient to try the 0.25 mg of Mirapex in the afternoon and at bedtime, as above.  If this does not work then would consider trying ropinirole as some people respond better to this than the Mirapex.  Patient also to follow-up with neurologist on some possible MS symptoms.  Patient on board with plan and verbalized understanding.  She will contact office if no improvement within the next few weeks or if she develops additional questions or concerns sooner.  · Obesity: Body mass index is 37.26 kg/m².. Patients who are overweight or obese are at increased risk of sleep apnea/ sleep disordered breathing. Weight reduction and healthy lifestyle are encouraged in overweight/ obese patients as part of a comprehensive approach to sleep apnea treatment.    · Multiple sclerosis: Followed by neurology.  Patient has been having some muscle complaints/ tightening sensations recently all over her body, she will discuss further with neurologist.      Patient will follow-up with Dr. Salas in 6-8 weeks or follow-up sooner for any issues or concerns.  Patient's questions were answered.          Thank you for allowing me to participate in the care of this patient.     Natividad Mckeon, KARY, APRN  [facility]            Greater than 30 minutes spent on office visit including time spent reviewing records, time spent face-to-face with patient, time spent reviewing patient case and plan of care with Dr. Honeycutt, time spent discussing treatment plan and test results with patient and needed follow-up with our office in outside offices.

## 2023-09-26 DIAGNOSIS — E66.9 OBESITY WITH SERIOUS COMORBIDITY, UNSPECIFIED CLASSIFICATION, UNSPECIFIED OBESITY TYPE: ICD-10-CM

## 2023-09-26 DIAGNOSIS — G25.81 RESTLESS LEGS SYNDROME (RLS): ICD-10-CM

## 2023-09-26 RX ORDER — PRAMIPEXOLE DIHYDROCHLORIDE 0.5 MG/1
TABLET ORAL
Qty: 45 TABLET | Refills: 5 | Status: SHIPPED | OUTPATIENT
Start: 2023-09-26

## 2024-03-08 DIAGNOSIS — G25.81 RESTLESS LEGS SYNDROME (RLS): ICD-10-CM

## 2024-03-08 DIAGNOSIS — E66.9 OBESITY WITH SERIOUS COMORBIDITY, UNSPECIFIED CLASSIFICATION, UNSPECIFIED OBESITY TYPE: ICD-10-CM

## 2024-03-08 RX ORDER — PRAMIPEXOLE DIHYDROCHLORIDE 0.5 MG/1
TABLET ORAL
Qty: 45 TABLET | Refills: 5 | Status: SHIPPED | OUTPATIENT
Start: 2024-03-08

## 2024-03-13 ENCOUNTER — OFFICE VISIT (OUTPATIENT)
Dept: SLEEP MEDICINE | Facility: HOSPITAL | Age: 64
End: 2024-03-13
Payer: COMMERCIAL

## 2024-03-13 VITALS — WEIGHT: 257 LBS | HEIGHT: 67 IN | OXYGEN SATURATION: 98 % | HEART RATE: 80 BPM | BODY MASS INDEX: 40.34 KG/M2

## 2024-03-13 DIAGNOSIS — E66.9 OBESITY WITH SERIOUS COMORBIDITY, UNSPECIFIED CLASSIFICATION, UNSPECIFIED OBESITY TYPE: ICD-10-CM

## 2024-03-13 DIAGNOSIS — G25.81 RESTLESS LEGS SYNDROME (RLS): Primary | ICD-10-CM

## 2024-03-13 PROCEDURE — G0463 HOSPITAL OUTPT CLINIC VISIT: HCPCS

## 2024-03-13 RX ORDER — PRAMIPEXOLE DIHYDROCHLORIDE 0.5 MG/1
0.5 TABLET ORAL NIGHTLY
Qty: 90 TABLET | Refills: 1 | Status: SHIPPED | OUTPATIENT
Start: 2024-03-13

## 2024-03-13 RX ORDER — TRAMADOL HYDROCHLORIDE 50 MG/1
50 TABLET ORAL NIGHTLY
Qty: 30 TABLET | Refills: 0 | Status: SHIPPED | OUTPATIENT
Start: 2024-03-13

## 2024-03-13 NOTE — PROGRESS NOTES
[facility] Note     Chief Complaint: RLS    Primary Care Physician: Maddie Cox APRN Lisa M John is a 63 y.o.female  was last seen at [facility] sleep lab: 1/11/2022.  Restless leg syndrome has gotten worse.  Last sleep study was over 20 years ago was negative for sleep apnea at the time.  Has had significant weight gain since then.  Was having symptoms concerning for augmentation at last visit; increased dosage of Mirapex led to worsening symptoms.  Had tried gabapentin 300 mg in the morning however felt very dizzy and lightheaded and discontinued.  Has not yet started ropinirole or trazodone.  At last visit I ordered a home sleep study to reassess her obstructive sleep apnea however insurance denied the study.  There was significant concern for augmentation with Mirapex.  We discussed trying to wean off of Mirapex at the same time start gabapentin 100 mg nightly.  Ferritin a few months ago was normal.  Has had difficulty with weaning off of Mirapex; had COVID-19 infection last month which made it difficult.  Currently taking 0.75 mg nightly of Mirapex.  Very difficult to wean off.  Has not added gabapentin.    5/10/2022: Since last visit patient has weaned Mirapex down to 0.5 mg nightly; less than that is unbearable in terms of RLS symptoms.  She has started gabapentin 100 mg nightly was which has helped with nighttime symptoms.  There are still some nights where she just cannot get to sleep because of restless legs.  Continues to have daytime symptoms.  Ferritin is above 45.    7/6/2022: At last visit advised to continue gabapentin 100 mg nightly and Mirapex 0.5 mg nightly; continue taking gabapentin 1 hour after taking Mirapex.  Add gabapentin 100 mg q. afternoon.  After about a week can increase nighttime gabapentin dosage to 200 mg.  Can also try further weaning Mirapex to 0.25 mg at this time.  Patient will call if she has any issues with withdrawal symptoms.  Today patient reports  afternoon dosage of gabapentin made her too drowsy so she discontinued it however sleeping better with 200 mg nightly and Mirapex 0.5 mg nightly.  Still working on weaning off Mirapex; daytime symptoms are maybe once every while not often not consistently.    12/7/2022: At last visit advised to continue gabapentin 200 mg nightly and Mirapex 0.5 mg nightly.  We discussed trying to further wean Mirapex to 0.25 mg.  Patient advised to call if she had any issues with withdrawal symptoms.  Advised 6-week follow-up.  Today patient reports still having daytime symptoms; difficulty to gabapentin during the day due to sleepiness which occurs.  Some nights are great with 100 mg gabapentin nightly and some nights are really bad.  Ferritin has not been checked in over a year.    7/5/2023: At last visit we discussed continuing to wean off of Mirapex due to concern for augmentation; advised to decrease to 0.25 mg nightly at the same time can increase gabapentin dosage to 200 mg nightly; and if needed can do 100 mg gabapentin during the day.  We also advised patient to check ferritin level.  Patient was last seen in the office 3/30/2023.  Presents today for follow-up on restless leg syndrome.  In March 2023 by different provider.  At that time had not gotten ferritin level checked.  At that visit reported she could not tolerate gabapentin even at 100 mg nightly.  Had not been able to wean off of Mirapex more than 0.5 mg/day.  Had noted some improvement in symptoms if she would take 0.25 mg in the afternoon and then another 0.25 mg at bedtime.  At last visit note was addended once ferritin was done which was within normal limits.  She was advised to take Mirapex 0.25 mg in the afternoon and then at bedtime.  Discussed if that did not work they would consider trying ropinirole.  We will respond better to that.  Patient was also advised to follow-up with neurologist on some possible MS symptoms.  Today patient reports continued  "issues.  Bedtime 10 PM wake time 5 AM.  Today reports continued issues with control restless leg symptoms.  Some nights medication will work well sometimes it will not.  Obese BMI 37.9.    3/13/24: Today reports was unable to wean further down off of Mirapex.  Patient is currently not taking any medication.  I placed a refill for her Mirapex last week because she ran out sooner than expected because she could not wean off further as we had planned at last visit.  I placed order last week but for some reason insurance denied the referral saying it was too soon despite me placing a new order.  Patient is been without Mirapex now for 4 days.  Patient reports she has not properly slept in 4 nights.  Needs to get some sleep.      Current Medications:    Current Outpatient Medications:     pramipexole (MIRAPEX) 0.5 MG tablet, Take 1 tablet by mouth Every Night., Disp: 90 tablet, Rfl: 1    traMADol (ULTRAM) 50 MG tablet, Take 1 tablet by mouth Every Night., Disp: 30 tablet, Rfl: 0   also entered in Sleep Questionnaire    Patient  has a past medical history of Acute tear medial meniscus, Arthritis, MS (multiple sclerosis), and Vitamin D deficiency.    Social History:    Social History     Socioeconomic History    Marital status:    Tobacco Use    Smoking status: Former     Current packs/day: 0.25     Average packs/day: 0.3 packs/day for 3.0 years (0.8 ttl pk-yrs)     Types: Cigarettes    Smokeless tobacco: Never   Substance and Sexual Activity    Alcohol use: Yes     Comment: HOLIDAYS    Drug use: No    Sexual activity: Not Currently     Partners: Male       Allergies:  Patient has no known allergies.    Vital Signs:    Vitals:    03/13/24 0731   Pulse: 80   SpO2: 98%   Weight: 117 kg (257 lb)   Height: 170.2 cm (67.01\")       Body mass index is 40.24 kg/m².    REVIEW OF SYSTEMS.  Full review of systems available on the intake form which is scanned in the media tab.  The relevant positive are noted below  Daytime " "excessive sleepiness with [place] Sleepiness Scale :Total score: 14       Physical exam:  Vitals:    03/13/24 0731   Pulse: 80   SpO2: 98%   Weight: 117 kg (257 lb)   Height: 170.2 cm (67.01\")      Body mass index is 40.24 kg/m².    HEENT: Head is atraumatic, normocephalic  Eyes: pupils are round equal and reacting to light and accommodation, conjunctiva normal  RESPIRATORY SYSTEM: Regular respiratory rate  CARDIOVASULAR SYSTEM: Regular rate  EXTREMITES: No cyanosis, clubbing  NEUROLOGICAL SYSTEM: Oriented x 3, no gross motor defects, gait normal    Impression:  1. Restless legs syndrome (RLS)    2. Obesity with serious comorbidity, unspecified classification, unspecified obesity type        Start tramadol 50 mg nightly; can increase to 100 mg if needed.  I placed to refill order for Mirapex mainly because she has not gotten any sleep in the past few nights and if this will help her get some sleep then at least she has a backup.  However we did discuss preferring not to start Mirapex again because she has now gotten off of it completely.  If any symptoms of dizziness lightheadedness hypotension patient will call and let us know and discontinue medication.  Discussed sitting up in bed for 2 to 3 minutes before walking out of bed to help prevent any orthostatic hypotension.    If no luck with tramadol we will consider rotigotine patch; or may need to consider Norco or Percocet.  Return to clinic in 1 month for follow-up or sooner if needed.    Weight loss will be strongly beneficial to reduce the severity of sleep-disordered breathing.  Caution during activities that require prolonged concentration is strongly advised if sleepiness returns.    Nidia Salas MD  Sleep Medicine  03/13/24  07:47 EDT      "

## 2024-03-29 DIAGNOSIS — M18.11 OSTEOARTHRITIS OF RIGHT THUMB: Primary | ICD-10-CM

## 2024-03-29 DIAGNOSIS — E66.9 OBESITY WITH SERIOUS COMORBIDITY, UNSPECIFIED CLASSIFICATION, UNSPECIFIED OBESITY TYPE: ICD-10-CM

## 2024-03-29 DIAGNOSIS — G25.81 RESTLESS LEGS SYNDROME (RLS): ICD-10-CM

## 2024-03-29 DIAGNOSIS — G89.29 OTHER CHRONIC PAIN: ICD-10-CM

## 2024-03-29 DIAGNOSIS — M54.12 CERVICAL RADICULOPATHY: ICD-10-CM

## 2024-03-29 DIAGNOSIS — M15.9 PRIMARY OSTEOARTHRITIS INVOLVING MULTIPLE JOINTS: ICD-10-CM

## 2024-03-29 DIAGNOSIS — M79.7 FIBROMYALGIA: ICD-10-CM

## 2024-03-29 RX ORDER — TRAMADOL HYDROCHLORIDE 50 MG/1
50 TABLET ORAL NIGHTLY
Qty: 14 TABLET | Refills: 0 | Status: SHIPPED | OUTPATIENT
Start: 2024-03-29

## 2025-07-16 ENCOUNTER — HOSPITAL ENCOUNTER (EMERGENCY)
Facility: HOSPITAL | Age: 65
Discharge: HOME | End: 2025-07-17
Attending: STUDENT IN AN ORGANIZED HEALTH CARE EDUCATION/TRAINING PROGRAM
Payer: MEDICAID

## 2025-07-16 ENCOUNTER — APPOINTMENT (OUTPATIENT)
Dept: CT IMAGING | Facility: HOSPITAL | Age: 65
End: 2025-07-16
Payer: MEDICAID

## 2025-07-16 DIAGNOSIS — N17.9 AKI (ACUTE KIDNEY INJURY): ICD-10-CM

## 2025-07-16 DIAGNOSIS — N39.0 ACUTE UTI: ICD-10-CM

## 2025-07-16 DIAGNOSIS — N20.0 KIDNEY STONE: Primary | ICD-10-CM

## 2025-07-16 LAB
ALBUMIN SERPL-MCNC: 4.3 G/DL (ref 3.5–5.2)
ALBUMIN/GLOB SERPL: 1.5 G/DL
ALP SERPL-CCNC: 88 U/L (ref 39–117)
ALT SERPL W P-5'-P-CCNC: 13 U/L (ref 1–33)
ANION GAP SERPL CALCULATED.3IONS-SCNC: 11.8 MMOL/L (ref 5–15)
AST SERPL-CCNC: 21 U/L (ref 1–32)
BASOPHILS # BLD AUTO: 0.07 10*3/MM3 (ref 0–0.2)
BASOPHILS NFR BLD AUTO: 0.6 % (ref 0–1.5)
BILIRUB SERPL-MCNC: 1.2 MG/DL (ref 0–1.2)
BILIRUB UR QL STRIP: NEGATIVE
BUN SERPL-MCNC: 19 MG/DL (ref 8–23)
BUN/CREAT SERPL: 14.2 (ref 7–25)
CALCIUM SPEC-SCNC: 9.1 MG/DL (ref 8.6–10.5)
CHLORIDE SERPL-SCNC: 106 MMOL/L (ref 98–107)
CLARITY UR: ABNORMAL
CO2 SERPL-SCNC: 22.2 MMOL/L (ref 22–29)
COLOR UR: ABNORMAL
CREAT SERPL-MCNC: 1.34 MG/DL (ref 0.57–1)
D-LACTATE SERPL-SCNC: 1.6 MMOL/L (ref 0.5–2)
DEPRECATED RDW RBC AUTO: 41.5 FL (ref 37–54)
EGFRCR SERPLBLD CKD-EPI 2021: 44.9 ML/MIN/1.73
EOSINOPHIL # BLD AUTO: 0.18 10*3/MM3 (ref 0–0.4)
EOSINOPHIL NFR BLD AUTO: 1.6 % (ref 0.3–6.2)
ERYTHROCYTE [DISTWIDTH] IN BLOOD BY AUTOMATED COUNT: 12.6 % (ref 12.3–15.4)
GLOBULIN UR ELPH-MCNC: 2.8 GM/DL
GLUCOSE SERPL-MCNC: 113 MG/DL (ref 65–99)
GLUCOSE UR STRIP-MCNC: NEGATIVE MG/DL
HCT VFR BLD AUTO: 42.4 % (ref 34–46.6)
HGB BLD-MCNC: 13.8 G/DL (ref 12–15.9)
HGB UR QL STRIP.AUTO: ABNORMAL
HOLD SPECIMEN: NORMAL
HOLD SPECIMEN: NORMAL
IMM GRANULOCYTES # BLD AUTO: 0.02 10*3/MM3 (ref 0–0.05)
IMM GRANULOCYTES NFR BLD AUTO: 0.2 % (ref 0–0.5)
KETONES UR QL STRIP: ABNORMAL
LEUKOCYTE ESTERASE UR QL STRIP.AUTO: ABNORMAL
LIPASE SERPL-CCNC: 34 U/L (ref 13–60)
LYMPHOCYTES # BLD AUTO: 4.53 10*3/MM3 (ref 0.7–3.1)
LYMPHOCYTES NFR BLD AUTO: 41.3 % (ref 19.6–45.3)
MCH RBC QN AUTO: 29.6 PG (ref 26.6–33)
MCHC RBC AUTO-ENTMCNC: 32.5 G/DL (ref 31.5–35.7)
MCV RBC AUTO: 90.8 FL (ref 79–97)
MONOCYTES # BLD AUTO: 0.83 10*3/MM3 (ref 0.1–0.9)
MONOCYTES NFR BLD AUTO: 7.6 % (ref 5–12)
NEUTROPHILS NFR BLD AUTO: 48.7 % (ref 42.7–76)
NEUTROPHILS NFR BLD AUTO: 5.35 10*3/MM3 (ref 1.7–7)
NITRITE UR QL STRIP: NEGATIVE
NRBC BLD AUTO-RTO: 0 /100 WBC (ref 0–0.2)
PH UR STRIP.AUTO: <=5 [PH] (ref 5–8)
PLATELET # BLD AUTO: 257 10*3/MM3 (ref 140–450)
PMV BLD AUTO: 10.5 FL (ref 6–12)
POTASSIUM SERPL-SCNC: 3.8 MMOL/L (ref 3.5–5.2)
PROT SERPL-MCNC: 7.1 G/DL (ref 6–8.5)
PROT UR QL STRIP: ABNORMAL
RBC # BLD AUTO: 4.67 10*6/MM3 (ref 3.77–5.28)
SODIUM SERPL-SCNC: 140 MMOL/L (ref 136–145)
SP GR UR STRIP: 1.02 (ref 1–1.03)
UROBILINOGEN UR QL STRIP: ABNORMAL
WBC NRBC COR # BLD AUTO: 10.98 10*3/MM3 (ref 3.4–10.8)
WHOLE BLOOD HOLD COAG: NORMAL
WHOLE BLOOD HOLD SPECIMEN: NORMAL

## 2025-07-16 PROCEDURE — 83605 ASSAY OF LACTIC ACID: CPT | Performed by: STUDENT IN AN ORGANIZED HEALTH CARE EDUCATION/TRAINING PROGRAM

## 2025-07-16 PROCEDURE — 25510000001 IOPAMIDOL 61 % SOLUTION: Performed by: STUDENT IN AN ORGANIZED HEALTH CARE EDUCATION/TRAINING PROGRAM

## 2025-07-16 PROCEDURE — 87086 URINE CULTURE/COLONY COUNT: CPT | Performed by: EMERGENCY MEDICINE

## 2025-07-16 PROCEDURE — 25010000002 MORPHINE PER 10 MG: Performed by: STUDENT IN AN ORGANIZED HEALTH CARE EDUCATION/TRAINING PROGRAM

## 2025-07-16 PROCEDURE — 80053 COMPREHEN METABOLIC PANEL: CPT | Performed by: STUDENT IN AN ORGANIZED HEALTH CARE EDUCATION/TRAINING PROGRAM

## 2025-07-16 PROCEDURE — 96375 TX/PRO/DX INJ NEW DRUG ADDON: CPT

## 2025-07-16 PROCEDURE — 74177 CT ABD & PELVIS W/CONTRAST: CPT

## 2025-07-16 PROCEDURE — 25010000002 ONDANSETRON PER 1 MG: Performed by: STUDENT IN AN ORGANIZED HEALTH CARE EDUCATION/TRAINING PROGRAM

## 2025-07-16 PROCEDURE — 83690 ASSAY OF LIPASE: CPT | Performed by: STUDENT IN AN ORGANIZED HEALTH CARE EDUCATION/TRAINING PROGRAM

## 2025-07-16 PROCEDURE — 81001 URINALYSIS AUTO W/SCOPE: CPT | Performed by: STUDENT IN AN ORGANIZED HEALTH CARE EDUCATION/TRAINING PROGRAM

## 2025-07-16 PROCEDURE — 85025 COMPLETE CBC W/AUTO DIFF WBC: CPT | Performed by: STUDENT IN AN ORGANIZED HEALTH CARE EDUCATION/TRAINING PROGRAM

## 2025-07-16 PROCEDURE — 99285 EMERGENCY DEPT VISIT HI MDM: CPT

## 2025-07-16 RX ORDER — MORPHINE SULFATE 2 MG/ML
4 INJECTION, SOLUTION INTRAMUSCULAR; INTRAVENOUS ONCE
Status: COMPLETED | OUTPATIENT
Start: 2025-07-16 | End: 2025-07-16

## 2025-07-16 RX ORDER — SODIUM CHLORIDE 0.9 % (FLUSH) 0.9 %
10 SYRINGE (ML) INJECTION AS NEEDED
Status: DISCONTINUED | OUTPATIENT
Start: 2025-07-16 | End: 2025-07-17 | Stop reason: HOSPADM

## 2025-07-16 RX ORDER — ONDANSETRON 2 MG/ML
4 INJECTION INTRAMUSCULAR; INTRAVENOUS ONCE
Status: COMPLETED | OUTPATIENT
Start: 2025-07-16 | End: 2025-07-16

## 2025-07-16 RX ORDER — IOPAMIDOL 612 MG/ML
85 INJECTION, SOLUTION INTRAVASCULAR
Status: COMPLETED | OUTPATIENT
Start: 2025-07-16 | End: 2025-07-16

## 2025-07-16 RX ADMIN — IOPAMIDOL 85 ML: 612 INJECTION, SOLUTION INTRAVENOUS at 23:09

## 2025-07-16 RX ADMIN — MORPHINE SULFATE 4 MG: 2 INJECTION, SOLUTION INTRAMUSCULAR; INTRAVENOUS at 22:42

## 2025-07-16 RX ADMIN — ONDANSETRON 4 MG: 2 INJECTION, SOLUTION INTRAMUSCULAR; INTRAVENOUS at 22:38

## 2025-07-17 VITALS
RESPIRATION RATE: 18 BRPM | DIASTOLIC BLOOD PRESSURE: 62 MMHG | OXYGEN SATURATION: 97 % | SYSTOLIC BLOOD PRESSURE: 126 MMHG | HEIGHT: 67 IN | WEIGHT: 240 LBS | HEART RATE: 71 BPM | TEMPERATURE: 97.7 F | BODY MASS INDEX: 37.67 KG/M2

## 2025-07-17 LAB
BACTERIA UR QL AUTO: ABNORMAL /HPF
HYALINE CASTS UR QL AUTO: ABNORMAL /LPF
RBC # UR STRIP: ABNORMAL /HPF
REF LAB TEST METHOD: ABNORMAL
SQUAMOUS #/AREA URNS HPF: ABNORMAL /HPF
WBC # UR STRIP: ABNORMAL /HPF
WBC CLUMPS # UR AUTO: PRESENT /HPF

## 2025-07-17 PROCEDURE — 25010000002 CEFTRIAXONE PER 250 MG: Performed by: EMERGENCY MEDICINE

## 2025-07-17 PROCEDURE — 96365 THER/PROPH/DIAG IV INF INIT: CPT

## 2025-07-17 RX ORDER — CEPHALEXIN 500 MG/1
500 CAPSULE ORAL 2 TIMES DAILY
Qty: 10 CAPSULE | Refills: 0 | Status: SHIPPED | OUTPATIENT
Start: 2025-07-17 | End: 2025-07-22

## 2025-07-17 RX ADMIN — CEFTRIAXONE SODIUM 2000 MG: 2 INJECTION, POWDER, FOR SOLUTION INTRAMUSCULAR; INTRAVENOUS at 00:49

## 2025-07-17 NOTE — ED PROVIDER NOTES
EMERGENCY DEPARTMENT ENCOUNTER  Room Number:  10/10  PCP: Provider, No Known  Independent Historians: Historian: Patient  Date of encounter:  7/17/2025  Patient Care Team:  Provider, No Known as PCP - Jonnie Veronica MD as PCP - Family Medicine     HPI:  Chief Complaint: had concerns including Abdominal Pain.     A complete HPI/ROS/PMH/PSH/SH/FH are unobtainable due to: EM_Unobtainable History : None    Context: Cayla Lopez is a 64 y.o. female with a medical history of fibromyalgia, MS who presents to the ED c/o acute abdominal pain.  Patient states that she began having left lower quadrant abdominal pain acutely at 2100 tonight.  She had no pain prior to this.  Described as a sharp and unrelenting pain.  She has had associated nausea, vomiting and urinary hesitancy.  No carmen hematuria or dysuria.  No diarrhea or constipation.  No fevers or chills.  No prior abdominal surgeries.  She denies history of pyelonephritis or kidney stones.    PAST MEDICAL HISTORY  Active Ambulatory Problems     Diagnosis Date Noted    Cervical radiculopathy 02/24/2016    Edema 02/24/2016    Ulnar nerve entrapment 02/24/2016    Fibromyalgia 02/24/2016    Insomnia 02/24/2016    Multiple sclerosis 02/24/2016    Varicose veins of lower extremities 02/24/2016    Thumb tendonitis 04/13/2016    Osteoarthritis of right thumb 04/13/2016    Restless leg syndrome 12/02/2016    Primary osteoarthritis involving multiple joints 05/15/2017    Meningioma 05/15/2017    Contusion of right knee 05/17/2018    Arthritis of right knee 05/17/2018     Resolved Ambulatory Problems     Diagnosis Date Noted    No Resolved Ambulatory Problems     Past Medical History:   Diagnosis Date    Acute tear medial meniscus     Arthritis     MS (multiple sclerosis)     Vitamin D deficiency        PAST SURGICAL HISTORY  Past Surgical History:   Procedure Laterality Date    KNEE ARTHROSCOPY Left 9/1/2020    Procedure: LEFT KNEE ARTHROSCOPY WITH PARTIAL  MEDIAL MENISECTOMY;  Surgeon: Braden Morgan MD;  Location: [facility];  Service: Orthopedics;  Laterality: Left;    WISDOM TOOTH EXTRACTION      FOUR       FAMILY HISTORY  Family History   Problem Relation Age of Onset    No Known Problems Mother     No Known Problems Father     Clotting disorder Sister     No Known Problems Brother     No Known Problems Maternal Aunt     No Known Problems Maternal Uncle     No Known Problems Paternal Aunt     No Known Problems Paternal Uncle     No Known Problems Maternal Grandmother     No Known Problems Maternal Grandfather     No Known Problems Paternal Grandmother     No Known Problems Paternal Grandfather     Breast cancer Sister     Anesthesia problems Neg Hx     Broken bones Neg Hx     Cancer Neg Hx     Collagen disease Neg Hx     Diabetes Neg Hx     Dislocations Neg Hx     Osteoporosis Neg Hx     Rheumatologic disease Neg Hx     Scoliosis Neg Hx     Severe sprains Neg Hx     Malig Hyperthermia Neg Hx        SOCIAL HISTORY  Social History     Socioeconomic History    Marital status:    Tobacco Use    Smoking status: Former     Current packs/day: 0.25     Average packs/day: 0.3 packs/day for 3.0 years (0.8 ttl pk-yrs)     Types: Cigarettes    Smokeless tobacco: Never   Substance and Sexual Activity    Alcohol use: Yes     Comment: HOLIDAYS    Drug use: No    Sexual activity: Not Currently     Partners: Male       Chronic or social conditions impacting patient care (Social Determinants of Health):  Social Drivers of Health     Tobacco Use: Medium Risk (7/16/2025)    Patient History     Smoking Tobacco Use: Former     Smokeless Tobacco Use: Never     Passive Exposure: Not on file   Alcohol Use: Not At Risk (10/25/2019)    AUDIT-C     Frequency of Alcohol Consumption: Never     Average Number of Drinks: Not on file     Frequency of Binge Drinking: Not on file   Financial Resource Strain: Not on file   Food Insecurity: Not on file   Transportation Needs: Not on  file   Physical Activity: Not on file   Stress: Not on file   Social Connections: Not on file   Interpersonal Safety: Not At Risk (7/16/2025)    Abuse Screen     Unsafe at Home or Work/School: no     Feels Threatened by Someone?: no     Does Anyone Keep You from Contacting Others or Doint Things Outside the Home?: no     Physical Sign of Abuse Present: no   Depression: At risk (10/28/2021)    PHQ-2     PHQ-2 Score: 9   Housing Stability: Not on file   Utilities: Not on file   Health Literacy: Not on file   Employment: Not on file   Disabilities: Not on file       ALLERGIES  Patient has no known allergies.    REVIEW OF SYSTEMS  Review of Systems  Included in HPI  All systems reviewed and negative except for those discussed in HPI.    PHYSICAL EXAM    I have reviewed the triage vital signs and nursing notes.    ED Triage Vitals   Temp Heart Rate Resp BP SpO2   07/16/25 2215 07/16/25 2215 07/16/25 2215 07/16/25 2217 07/16/25 2215   97.7 °F (36.5 °C) 84 18 164/89 99 %      Temp src Heart Rate Source Patient Position BP Location FiO2 (%)   07/16/25 2215 07/16/25 2215 07/16/25 2217 07/16/25 2217 --   Oral Monitor Sitting Right arm        Physical Exam  Constitutional:       General: She is not in acute distress.     Appearance: Normal appearance. She is not ill-appearing or toxic-appearing.   HENT:      Head: Normocephalic and atraumatic.      Nose: Nose normal.      Mouth/Throat:      Mouth: Mucous membranes are moist.      Pharynx: Oropharynx is clear.   Eyes:      Extraocular Movements: Extraocular movements intact.      Conjunctiva/sclera: Conjunctivae normal.      Pupils: Pupils are equal, round, and reactive to light.   Cardiovascular:      Rate and Rhythm: Normal rate and regular rhythm.      Pulses: Normal pulses.      Heart sounds: Normal heart sounds. No murmur heard.     No friction rub. No gallop.   Pulmonary:      Effort: Pulmonary effort is normal. No respiratory distress.      Breath sounds: Normal breath  sounds. No stridor. No wheezing, rhonchi or rales.   Abdominal:      General: Abdomen is flat. There is no distension.      Palpations: Abdomen is soft.      Tenderness: There is abdominal tenderness (Left lower quadrant and periumbilical area). There is no guarding or rebound.   Musculoskeletal:      Cervical back: Normal range of motion and neck supple.   Skin:     General: Skin is warm and dry.   Neurological:      General: No focal deficit present.      Mental Status: She is alert and oriented to person, place, and time. Mental status is at baseline.   Psychiatric:         Mood and Affect: Mood normal.         Behavior: Behavior normal.         Thought Content: Thought content normal.         Judgment: Judgment normal.         LAB RESULTS  Recent Results (from the past 24 hours)   Comprehensive Metabolic Panel    Collection Time: 07/16/25 10:30 PM    Specimen: Blood   Result Value Ref Range    Glucose 113 (H) 65 - 99 mg/dL    BUN 19.0 8.0 - 23.0 mg/dL    Creatinine 1.34 (H) 0.57 - 1.00 mg/dL    Sodium 140 136 - 145 mmol/L    Potassium 3.8 3.5 - 5.2 mmol/L    Chloride 106 98 - 107 mmol/L    CO2 22.2 22.0 - 29.0 mmol/L    Calcium 9.1 8.6 - 10.5 mg/dL    Total Protein 7.1 6.0 - 8.5 g/dL    Albumin 4.3 3.5 - 5.2 g/dL    ALT (SGPT) 13 1 - 33 U/L    AST (SGOT) 21 1 - 32 U/L    Alkaline Phosphatase 88 39 - 117 U/L    Total Bilirubin 1.2 0.0 - 1.2 mg/dL    Globulin 2.8 gm/dL    A/G Ratio 1.5 g/dL    BUN/Creatinine Ratio 14.2 7.0 - 25.0    Anion Gap 11.8 5.0 - 15.0 mmol/L    eGFR 44.9 (L) >60.0 mL/min/1.73   Lipase    Collection Time: 07/16/25 10:30 PM    Specimen: Blood   Result Value Ref Range    Lipase 34 13 - 60 U/L   Lactic Acid, Plasma    Collection Time: 07/16/25 10:30 PM    Specimen: Blood   Result Value Ref Range    Lactate 1.6 0.5 - 2.0 mmol/L   Green Top (Gel)    Collection Time: 07/16/25 10:30 PM   Result Value Ref Range    Extra Tube Hold for add-ons.    Lavender Top    Collection Time: 07/16/25 10:30 PM    Result Value Ref Range    Extra Tube hold for add-on    Gold Top - SST    Collection Time: 07/16/25 10:30 PM   Result Value Ref Range    Extra Tube Hold for add-ons.    Light Blue Top    Collection Time: 07/16/25 10:30 PM   Result Value Ref Range    Extra Tube Hold for add-ons.    CBC Auto Differential    Collection Time: 07/16/25 10:30 PM    Specimen: Blood   Result Value Ref Range    WBC 10.98 (H) 3.40 - 10.80 10*3/mm3    RBC 4.67 3.77 - 5.28 10*6/mm3    Hemoglobin 13.8 12.0 - 15.9 g/dL    Hematocrit 42.4 34.0 - 46.6 %    MCV 90.8 79.0 - 97.0 fL    MCH 29.6 26.6 - 33.0 pg    MCHC 32.5 31.5 - 35.7 g/dL    RDW 12.6 12.3 - 15.4 %    RDW-SD 41.5 37.0 - 54.0 fl    MPV 10.5 6.0 - 12.0 fL    Platelets 257 140 - 450 10*3/mm3    Neutrophil % 48.7 42.7 - 76.0 %    Lymphocyte % 41.3 19.6 - 45.3 %    Monocyte % 7.6 5.0 - 12.0 %    Eosinophil % 1.6 0.3 - 6.2 %    Basophil % 0.6 0.0 - 1.5 %    Immature Grans % 0.2 0.0 - 0.5 %    Neutrophils, Absolute 5.35 1.70 - 7.00 10*3/mm3    Lymphocytes, Absolute 4.53 (H) 0.70 - 3.10 10*3/mm3    Monocytes, Absolute 0.83 0.10 - 0.90 10*3/mm3    Eosinophils, Absolute 0.18 0.00 - 0.40 10*3/mm3    Basophils, Absolute 0.07 0.00 - 0.20 10*3/mm3    Immature Grans, Absolute 0.02 0.00 - 0.05 10*3/mm3    nRBC 0.0 0.0 - 0.2 /100 WBC   Urinalysis With Microscopic If Indicated (No Culture) - Urine, Clean Catch    Collection Time: 07/16/25 10:51 PM    Specimen: Urine, Clean Catch   Result Value Ref Range    Color, UA Dark Yellow (A) Yellow, Straw    Appearance, UA Cloudy (A) Clear    pH, UA <=5.0 5.0 - 8.0    Specific Gravity, UA 1.025 1.005 - 1.030    Glucose, UA Negative Negative    Ketones, UA Trace (A) Negative    Bilirubin, UA Negative Negative    Blood, UA Trace (A) Negative    Protein, UA Trace (A) Negative    Leuk Esterase, UA Moderate (2+) (A) Negative    Nitrite, UA Negative Negative    Urobilinogen, UA 0.2 E.U./dL 0.2 - 1.0 E.U./dL   Urinalysis, Microscopic Only - Urine, Clean Catch     Collection Time: 07/16/25 10:51 PM    Specimen: Urine, Clean Catch   Result Value Ref Range    RBC, UA 21-50 (A) None Seen, 0-2 /HPF    WBC, UA Too Numerous to Count (A) None Seen, 0-2 /HPF    Bacteria, UA 2+ (A) None Seen /HPF    Squamous Epithelial Cells, UA 7-12 (A) None Seen, 0-2 /HPF    Hyaline Casts, UA 0-2 None Seen /LPF    WBC Clumps, UA Present None Seen /HPF    Methodology Automated Microscopy        RADIOLOGY  CT Abdomen Pelvis With Contrast  Result Date: 7/16/2025  CT  ABDOMEN & PELVIS WITH IV CONTRAST  HISTORY: Left-sided abdominal pain  TECHNIQUE: CT of the abdomen and pelvis with  IV contrast. Coronal and sagittal reconstructions were obtained.  Radiation dose reduction techniques were utilized, including automated exposure control, and exposure modulation based on body size.  COMPARISON: CT abdomen pelvis 8/24/2015  FINDINGS:  Lung bases: Visualized lung bases are unremarkable.  Abdomen: The liver and gallbladder are normal.  The spleen and pancreas appear normal.  Both adrenal glands are normal. There is no abdominal or retroperitoneal adenopathy or other mass.  There is no abdominal or retroperitoneal inflammatory change, or abnormal fluid or air collection.  There is no evidence of bowel obstruction.   : Right kidney demonstrates an approximately 3 mm nonobstructing calculus, but there is no hydronephrosis or ureterolithiasis. No left renal calculi are seen, but there is mild left hydronephrosis and ureterectasis. The ureter is dilated down to the pelvis. An approximately 2 mm calculus is seen in the base of the bladder, likely representing a recently passed stone.  Pelvis:  The appendix is clearly identified, and is normal.  There is no pelvic inflammatory change or other abnormal fluid collection.  There is no pelvic adenopathy or other soft tissue mass.  There is no CT evidence of hernia or bowel obstruction.  There are spinal degenerative changes, but there is no acute bony abnormality.        Mild left hydronephrosis and ureterectasis, with a 2 mm stone in the base of the urinary bladder adjacent to the UVJ, suggesting a recently passed left ureteral stone.  There is a nonobstructing 3 mm right renal calculus.     This report was finalized on 7/16/2025 11:57 PM by Dr. Cody Del Cid M.D on Workstation: [id number]        MEDICATIONS GIVEN IN ER  Medications   sodium chloride 0.9 % flush 10 mL (has no administration in time range)   cefTRIAXone (ROCEPHIN) 2,000 mg in sodium chloride 0.9 % 100 mL MBP (has no administration in time range)   ondansetron (ZOFRAN) injection 4 mg (4 mg Intravenous Given 7/16/25 2238)   morphine injection 4 mg (4 mg Intravenous Given 7/16/25 2242)   iopamidol (ISOVUE-300) 61 % injection 85 mL (85 mL Intravenous Given by Other 7/16/25 2306)       ORDERS PLACED DURING THIS VISIT:  Orders Placed This Encounter   Procedures    CT Abdomen Pelvis With Contrast    [place] Draw    Comprehensive Metabolic Panel    Lipase    Urinalysis With Microscopic If Indicated (No Culture) - Urine, Clean Catch    Lactic Acid, Plasma    CBC Auto Differential    Urinalysis, Microscopic Only - Urine, Clean Catch    Urinalysis With Culture If Indicated -    NPO Diet NPO Type: Strict NPO    Undress & Gown    Insert Peripheral IV    CBC & Differential    Green Top (Gel)    Lavender Top    Gold Top - SST    Light Blue Top       OUTPATIENT MEDICATION MANAGEMENT:  Current Facility-Administered Medications Ordered in Epic   Medication Dose Route Frequency Provider Last Rate Last Admin    cefTRIAXone (ROCEPHIN) 2,000 mg in sodium chloride 0.9 % 100 mL MBP  2,000 mg Intravenous Once Will Lopez MD        sodium chloride 0.9 % flush 10 mL  10 mL Intravenous PRN Rodney Christianson MD         Current Outpatient Medications Ordered in Epic   Medication Sig Dispense Refill    cephalexin (KEFLEX) 500 MG capsule Take 1 capsule by mouth 2 (Two) Times a Day for 5 days. 10 capsule 0    pramipexole (MIRAPEX)  0.5 MG tablet Take 1 tablet by mouth Every Night. 90 tablet 1    traMADol (ULTRAM) 50 MG tablet Take 1 tablet by mouth Every Night. 14 tablet 0       PROCEDURES  Procedures    PROGRESS, DATA ANALYSIS, CONSULTS, AND MEDICAL DECISION MAKING  All labs have been independently interpreted by me.  All radiology studies have been reviewed by me. All EKG's have been independently viewed and interpreted by me.  Discussion below represents my analysis of pertinent findings related to patient's condition, differential diagnosis, treatment plan and final disposition.    Differential diagnosis includes but is not limited to diverticulitis, colitis, kidney stone, UTI, pyelonephritis.    Clinical Scores:                   ED Course as of 07/17/25 0043   Wed Jul 16, 2025 2315 WBC(!): 10.98 [AB]   2315 Hemoglobin: 13.8 [AB]   2315 Lactate: 1.6 [AB]   2315 CT Abdomen Pelvis With Contrast  My independent interpretation of the imaging study is no SBO, no free air, no ureteral stone [AB]   2320 Creatinine(!): 1.34 [AB]   2340 Lipase: 34 [AB]   u Jul 17, 2025   0041 Updated patient on results.  Patient states that her pain and nausea have significantly improved.  Will give a dose of Rocephin to treat UTI.  Patient agreeable with discharge home.  Encouraged her to drink ample fluids given her MARCUS and to have these values rechecked with your PCP. [AB]      ED Course User Index  [AB] Will Lopez MD             AS OF 00:43 EDT VITALS:    BP - 126/62  HR - 71  TEMP - 97.7 °F (36.5 °C) (Oral)  O2 SATS - 97%    COMPLEXITY OF CARE  Admission was considered but after careful review of the patient's presentation, physical examination, diagnostic results, and response to treatment the patient may be safely discharged with outpatient follow-up.      DIAGNOSIS  Final diagnoses:   Kidney stone   Acute UTI   MARCUS (acute kidney injury)         DISPOSITION  ED Disposition       ED Disposition   Discharge    Condition   Stable    Comment   --                 Please note that portions of this document were completed with a voice recognition program.    Note Disclaimer: At [facility], we believe that sharing information builds trust and better relationships. You are receiving this note because you recently visited [facility]. It is possible you will see health information before a provider has talked with you about it. This kind of information can be easy to misunderstand. To help you fully understand what it means for your health, we urge you to discuss this note with your provider.         Will Lopez MD  07/17/25 0043

## 2025-07-18 LAB — BACTERIA SPEC AEROBE CULT: NORMAL

## (undated) DEVICE — SUT ETHLN 4/0 PS2 PLSTC 1667G

## (undated) DEVICE — BLD SHV DBL/CUT COOLCUT 4MM 13CM

## (undated) DEVICE — SKIN PREP TRAY W/CHG: Brand: MEDLINE INDUSTRIES, INC.

## (undated) DEVICE — GLV SURG BIOGEL LTX PF 6 1/2

## (undated) DEVICE — TUBING, SUCTION, 1/4" X 20', STRAIGHT: Brand: MEDLINE INDUSTRIES, INC.

## (undated) DEVICE — GLV SURG SIGNATURE ESSENTIAL PF LTX SZ7.5

## (undated) DEVICE — UNDERCAST PADDING: Brand: DEROYAL

## (undated) DEVICE — PAD,ABDOMINAL,8"X10",ST,LF: Brand: MEDLINE

## (undated) DEVICE — GLV SURG BIOGEL LTX PF 8

## (undated) DEVICE — PK ARTHSCP 40

## (undated) DEVICE — DRSNG WND GZ CURAD OIL EMULSION 3X3IN STRL

## (undated) DEVICE — TUBING SET, GRAVITY, 4-SPIKE

## (undated) DEVICE — ABL ASP APOLLO RF XL 90D

## (undated) DEVICE — GLV SURG SIGNATURE ESSENTIAL PF LTX SZ6.5